# Patient Record
Sex: FEMALE | Race: WHITE | Employment: PART TIME | URBAN - METROPOLITAN AREA
[De-identification: names, ages, dates, MRNs, and addresses within clinical notes are randomized per-mention and may not be internally consistent; named-entity substitution may affect disease eponyms.]

---

## 2017-05-03 ENCOUNTER — OFFICE VISIT (OUTPATIENT)
Dept: FAMILY MEDICINE CLINIC | Age: 47
End: 2017-05-03
Payer: MEDICAID

## 2017-05-03 VITALS
HEIGHT: 65 IN | BODY MASS INDEX: 31.16 KG/M2 | DIASTOLIC BLOOD PRESSURE: 80 MMHG | RESPIRATION RATE: 18 BRPM | WEIGHT: 187 LBS | SYSTOLIC BLOOD PRESSURE: 128 MMHG | HEART RATE: 68 BPM

## 2017-05-03 DIAGNOSIS — I10 ESSENTIAL HYPERTENSION: Primary | ICD-10-CM

## 2017-05-03 DIAGNOSIS — R07.89 LEFT-SIDED CHEST WALL PAIN: ICD-10-CM

## 2017-05-03 DIAGNOSIS — M65.332 TRIGGER FINGER, LEFT MIDDLE FINGER: ICD-10-CM

## 2017-05-03 DIAGNOSIS — Z12.31 SCREENING MAMMOGRAM, ENCOUNTER FOR: ICD-10-CM

## 2017-05-03 PROCEDURE — 99213 OFFICE O/P EST LOW 20 MIN: CPT | Performed by: NURSE PRACTITIONER

## 2017-05-03 RX ORDER — COVID-19 ANTIGEN TEST
220 KIT MISCELLANEOUS 2 TIMES DAILY
Qty: 24 CAPSULE | Refills: 0 | COMMUNITY
Start: 2017-05-03 | End: 2018-01-22 | Stop reason: ALTCHOICE

## 2017-05-03 RX ORDER — HYDROCHLOROTHIAZIDE 25 MG/1
25 TABLET ORAL DAILY
Qty: 30 TABLET | Refills: 5 | Status: SHIPPED | OUTPATIENT
Start: 2017-05-03 | End: 2017-11-22 | Stop reason: SDUPTHER

## 2017-05-03 RX ORDER — COVID-19 ANTIGEN TEST
220 KIT MISCELLANEOUS 2 TIMES DAILY
Qty: 24 CAPSULE | Refills: 0 | COMMUNITY
Start: 2017-05-03 | End: 2017-05-03 | Stop reason: ALTCHOICE

## 2017-05-03 ASSESSMENT — ENCOUNTER SYMPTOMS
COUGH: 0
ABDOMINAL DISTENTION: 0
EYES NEGATIVE: 1
SINUS PRESSURE: 0
SHORTNESS OF BREATH: 0
NAUSEA: 0
RHINORRHEA: 0

## 2017-05-18 ENCOUNTER — HOSPITAL ENCOUNTER (OUTPATIENT)
Age: 47
Discharge: HOME | End: 2017-05-18
Payer: MEDICAID

## 2017-10-24 ENCOUNTER — TELEPHONE (OUTPATIENT)
Dept: FAMILY MEDICINE CLINIC | Age: 47
End: 2017-10-24

## 2017-10-24 DIAGNOSIS — L73.9 FOLLICULITIS: Primary | ICD-10-CM

## 2017-10-24 RX ORDER — CEPHALEXIN 500 MG/1
500 CAPSULE ORAL 3 TIMES DAILY
Qty: 21 CAPSULE | Refills: 0 | Status: SHIPPED | OUTPATIENT
Start: 2017-10-24 | End: 2017-10-31

## 2017-11-22 ENCOUNTER — TELEPHONE (OUTPATIENT)
Dept: FAMILY MEDICINE CLINIC | Age: 47
End: 2017-11-22

## 2017-11-22 DIAGNOSIS — I10 ESSENTIAL HYPERTENSION: ICD-10-CM

## 2017-11-22 RX ORDER — HYDROCHLOROTHIAZIDE 25 MG/1
25 TABLET ORAL DAILY
Qty: 90 TABLET | Refills: 1 | Status: SHIPPED | OUTPATIENT
Start: 2017-11-22 | End: 2018-03-22

## 2017-11-22 NOTE — TELEPHONE ENCOUNTER
Patient asking for a refill on BP medication    Yang Ingrma    Scheduled a Follow up for HTN    Health Maintenance   Topic Date Due    DTaP/Tdap/Td vaccine (1 - Tdap) 05/09/1989    Diabetes screen  05/09/2010    Flu vaccine (1) 09/01/2017    Cervical cancer screen  04/18/2019    Lipid screen  04/28/2021    HIV screen  Addressed             (applicable per patient's age: Cancer Screenings, Depression Screening, Fall Risk Screening, Immunizations)    LDL Cholesterol (mg/dL)   Date Value   04/28/2016 116     AST (U/L)   Date Value   04/28/2016 14     ALT (U/L)   Date Value   04/28/2016 16     BUN (mg/dL)   Date Value   04/28/2016 14      (goal A1C is < 7)   (goal LDL is <100) need 30-50% reduction from baseline     BP Readings from Last 3 Encounters:   05/03/17 128/80   07/25/16 138/84   06/22/16 125/87    (goal /80)      All Future Testing planned in [facility]:  Lab Frequency Next Occurrence   SATNAM CAD SCREENING Once 12/18/2017       Next Visit Date:  No future appointments.          Patient Active Problem List:     Obstructive sleep apnea on CPAP     Essential hypertension     Biliary dyskinesia

## 2018-01-16 ENCOUNTER — APPOINTMENT (OUTPATIENT)
Dept: GENERAL RADIOLOGY | Age: 48
End: 2018-01-16
Payer: COMMERCIAL

## 2018-01-16 ENCOUNTER — HOSPITAL ENCOUNTER (EMERGENCY)
Age: 48
Discharge: HOME | End: 2018-01-16
Attending: FAMILY MEDICINE
Payer: COMMERCIAL

## 2018-01-16 VITALS
HEART RATE: 88 BPM | TEMPERATURE: 98.1 F | SYSTOLIC BLOOD PRESSURE: 154 MMHG | RESPIRATION RATE: 16 BRPM | DIASTOLIC BLOOD PRESSURE: 75 MMHG | OXYGEN SATURATION: 100 %

## 2018-01-16 DIAGNOSIS — S63.502A LEFT WRIST SPRAIN, INITIAL ENCOUNTER: ICD-10-CM
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
## 2018-01-16 DIAGNOSIS — M62.838 TRAPEZIUS MUSCLE SPASM: ICD-10-CM

## 2018-01-16 DIAGNOSIS — S93.492A SPRAIN OF ANTERIOR TALOFIBULAR LIGAMENT OF LEFT ANKLE, INITIAL ENCOUNTER: Primary | ICD-10-CM

## 2018-01-16 PROCEDURE — 6360000002 HC RX W HCPCS: Performed by: FAMILY MEDICINE

## 2018-01-16 PROCEDURE — 73610 X-RAY EXAM OF ANKLE: CPT

## 2018-01-16 PROCEDURE — 73110 X-RAY EXAM OF WRIST: CPT

## 2018-01-16 PROCEDURE — 99284 EMERGENCY DEPT VISIT MOD MDM: CPT

## 2018-01-16 PROCEDURE — 96372 THER/PROPH/DIAG INJ SC/IM: CPT

## 2018-01-16 PROCEDURE — 73030 X-RAY EXAM OF SHOULDER: CPT

## 2018-01-16 RX ORDER — IBUPROFEN 800 MG/1
800 TABLET ORAL EVERY 8 HOURS PRN
Qty: 30 TABLET | Refills: 1 | Status: SHIPPED | OUTPATIENT
Start: 2018-01-16 | End: 2018-03-21 | Stop reason: ALTCHOICE

## 2018-01-16 RX ORDER — CYCLOBENZAPRINE HCL 10 MG
10 TABLET ORAL 3 TIMES DAILY PRN
Qty: 12 TABLET | Refills: 0 | Status: SHIPPED | OUTPATIENT
Start: 2018-01-16 | End: 2018-01-26

## 2018-01-16 RX ORDER — KETOROLAC TROMETHAMINE 30 MG/ML
60 INJECTION, SOLUTION INTRAMUSCULAR; INTRAVENOUS ONCE
Status: COMPLETED | OUTPATIENT
Start: 2018-01-16 | End: 2018-01-16

## 2018-01-16 RX ADMIN — KETOROLAC TROMETHAMINE 60 MG: 30 INJECTION, SOLUTION INTRAMUSCULAR at 21:59

## 2018-01-16 ASSESSMENT — PAIN DESCRIPTION - ORIENTATION: ORIENTATION: LEFT

## 2018-01-16 ASSESSMENT — PAIN SCALES - GENERAL
PAINLEVEL_OUTOF10: 7
PAINLEVEL_OUTOF10: 4

## 2018-01-16 ASSESSMENT — PAIN DESCRIPTION - PAIN TYPE: TYPE: ACUTE PAIN

## 2018-01-17 NOTE — ED PROVIDER NOTES
[facility]  eMERGENCY dEPARTMENT eNCOUnter          Alma Rosa Avilez       Chief Complaint   Patient presents with    Fall     pt. fell at work at approx. 1800. Pt. states she lost her footing on the stairs and fell on her left side. Pt. reports pain in her left trapezius, left hip, left lateral foot and left wrist. Denies hitting her head or LOC. Nurses Notes reviewed and I agree except as noted in the HPI. HISTORY OF PRESENT ILLNESS    Danisha Davis is a 52 y.o. female who presents To emergency room with complaint of accidental fall at work, patient states that she lost her footing on some stairs and fell and/or left side, patient reporting pain in her left hip left lateral foot left wrist, as well as her left shoulder indicating her trapezius muscle group. Patient denies head or head denies loss of consciousness. She rates her wall pain is 7 out of 10. REVIEW OF SYSTEMS     Review of Systems   All other systems reviewed and are negative. PAST MEDICAL HISTORY    has a past medical history of Anxiety; GERD (gastroesophageal reflux disease); Headache(784.0); Hypertension; and Sleep apnea. SURGICAL HISTORY      has a past surgical history that includes Hysterectomy and Cholecystectomy (06-). CURRENT MEDICATIONS       Discharge Medication List as of 1/16/2018 11:03 PM      CONTINUE these medications which have NOT CHANGED    Details   hydrochlorothiazide (HYDRODIURIL) 25 MG tablet Take 1 tablet by mouth daily, Disp-90 tablet, R-1Normal      Naproxen Sodium (ALEVE) 220 MG CAPS Take 220 mg by mouth 2 times daily, Disp-24 capsule, [id number] exp 2/19Sample      !! ibuprofen (ADVIL;MOTRIN) 200 MG tablet Take 400 mg by mouth every 6 hours as needed for Pain      omeprazole (PRILOSEC) 40 MG capsule Take 1 capsule by mouth daily, Disp-30 capsule, R-5       !! - Potential duplicate medications found. Please discuss with provider.           ALLERGIES     is allergic to codeine and morphine. FAMILY HISTORY     indicated that her mother is . She indicated that her father is . She indicated that all of her five sisters are alive. She indicated that her brother is alive. She indicated that her maternal grandmother is . She indicated that her maternal grandfather is . She indicated that her paternal grandmother is . She indicated that her paternal grandfather is . She indicated that her daughter is alive. She indicated that her son is alive. family history includes Cancer in her father and paternal grandmother. SOCIAL HISTORY      reports that she has never smoked. She does not have any smokeless tobacco history on file. She reports that she does not drink alcohol or use drugs. PHYSICAL EXAM     INITIAL VITALS:  oral temperature is 98.1 °F (36.7 °C). Her blood pressure is 154/75 (abnormal) and her pulse is 88. Her respiration is 16 and oxygen saturation is 100%. Physical Exam   Constitutional: Patient is oriented to person, place, and time. Patient appears well-developed and well-nourished. Patient is active and cooperative. HENT:   Head: Normocephalic and atraumatic. Head is without contusion. Right Ear: Hearing and external ear normal. No drainage. Left Ear: Hearing and external ear normal. No drainage. Nose: Nose normal. No nasal deformity. No epistaxis. Mouth/Throat: Mucous membranes are not dry. Eyes: EOMI. Conjunctivae, sclera, and lids are normal. Right eye exhibits no discharge. Left eye exhibits no discharge. Neck: Full passive range of motion without pain and phonation normal.  Left superior trapezius muscle with spasm as compared to contralateral without overlying integument aberration  Cardiovascular:  Normal rate, regular rhythm and intact distal pulses. Pulses: Left radial pulse  2+   Pulmonary/Chest: Effort normal. No tachypnea and no bradypnea.  No wheezes, rhonchi, or

## 2018-01-22 ENCOUNTER — OFFICE VISIT (OUTPATIENT)
Dept: FAMILY MEDICINE CLINIC | Age: 48
End: 2018-01-22
Payer: MEDICAID

## 2018-01-22 VITALS
DIASTOLIC BLOOD PRESSURE: 78 MMHG | RESPIRATION RATE: 18 BRPM | HEART RATE: 96 BPM | WEIGHT: 221 LBS | OXYGEN SATURATION: 97 % | BODY MASS INDEX: 36.82 KG/M2 | SYSTOLIC BLOOD PRESSURE: 128 MMHG | HEIGHT: 65 IN

## 2018-01-22 DIAGNOSIS — Z13.220 SCREENING CHOLESTEROL LEVEL: ICD-10-CM

## 2018-01-22 DIAGNOSIS — Z79.899 ENCOUNTER FOR MONITORING DIURETIC THERAPY: ICD-10-CM

## 2018-01-22 DIAGNOSIS — Z51.81 ENCOUNTER FOR MONITORING DIURETIC THERAPY: ICD-10-CM

## 2018-01-22 DIAGNOSIS — G47.30 SLEEP APNEA, UNSPECIFIED TYPE: ICD-10-CM

## 2018-01-22 DIAGNOSIS — Z13.0 SCREENING, ANEMIA, DEFICIENCY, IRON: ICD-10-CM

## 2018-01-22 DIAGNOSIS — E55.9 VITAMIN D DEFICIENCY: ICD-10-CM

## 2018-01-22 DIAGNOSIS — I10 ESSENTIAL HYPERTENSION: Primary | ICD-10-CM

## 2018-01-22 DIAGNOSIS — E66.09 CLASS 2 OBESITY DUE TO EXCESS CALORIES WITHOUT SERIOUS COMORBIDITY WITH BODY MASS INDEX (BMI) OF 36.0 TO 36.9 IN ADULT: ICD-10-CM

## 2018-01-22 DIAGNOSIS — K21.9 GASTROESOPHAGEAL REFLUX DISEASE WITHOUT ESOPHAGITIS: ICD-10-CM

## 2018-01-22 DIAGNOSIS — Z13.29 THYROID DISORDER SCREENING: ICD-10-CM

## 2018-01-22 DIAGNOSIS — I87.2 VENOUS STASIS DERMATITIS OF RIGHT LOWER EXTREMITY: ICD-10-CM

## 2018-01-22 DIAGNOSIS — K14.4 SMOOTH TONGUE: ICD-10-CM

## 2018-01-22 DIAGNOSIS — Z83.3 FAMILY HISTORY OF DIABETES MELLITUS: ICD-10-CM

## 2018-01-22 PROCEDURE — 99214 OFFICE O/P EST MOD 30 MIN: CPT | Performed by: NURSE PRACTITIONER

## 2018-01-22 PROCEDURE — G8417 CALC BMI ABV UP PARAM F/U: HCPCS | Performed by: NURSE PRACTITIONER

## 2018-01-22 PROCEDURE — G8484 FLU IMMUNIZE NO ADMIN: HCPCS | Performed by: NURSE PRACTITIONER

## 2018-01-22 PROCEDURE — 1036F TOBACCO NON-USER: CPT | Performed by: NURSE PRACTITIONER

## 2018-01-22 PROCEDURE — G8427 DOCREV CUR MEDS BY ELIG CLIN: HCPCS | Performed by: NURSE PRACTITIONER

## 2018-01-22 RX ORDER — SUCRALFATE 1 G/1
1 TABLET ORAL DAILY
Qty: 30 TABLET | Refills: 1 | Status: SHIPPED | OUTPATIENT
Start: 2018-01-22 | End: 2018-03-22

## 2018-01-22 RX ORDER — OMEPRAZOLE 20 MG/1
20 CAPSULE, DELAYED RELEASE ORAL DAILY
Qty: 90 CAPSULE | Refills: 0 | Status: SHIPPED | OUTPATIENT
Start: 2018-01-22 | End: 2018-03-22

## 2018-01-22 ASSESSMENT — PATIENT HEALTH QUESTIONNAIRE - PHQ9
SUM OF ALL RESPONSES TO PHQ QUESTIONS 1-9: 0
1. LITTLE INTEREST OR PLEASURE IN DOING THINGS: 0
SUM OF ALL RESPONSES TO PHQ9 QUESTIONS 1 & 2: 0
2. FEELING DOWN, DEPRESSED OR HOPELESS: 0

## 2018-01-22 NOTE — PROGRESS NOTES
[facility] [address]  Dept: [phone]  Dept Fax: [phone]    Last encounter Visit date not found  Visit Information    Have you changed or started any medications since your last visit including any over-the-counter medicines, vitamins, or herbal medicines? no   Are you having any side effects from any of your medications? -  no  Have you stopped taking any of your medications? Is so, why? -  no    Have you seen any other physician or provider since your last visit? Yes - Records Obtained  Have you had any other diagnostic tests since your last visit? Yes - Records Obtained  Have you been seen in the emergency room and/or had an admission to a hospital since we last saw you? Yes - Records Obtained  Have you had your routine dental cleaning in the past 6 months? no    Have you activated your [employer] account? If not, what are your barriers? Yes     Patient Care Team:  Alyssa Brandon NP as PCP - General (Certified Nurse Practitioner)    Medical History Review  Past Medical, Family, and Social History reviewed and does contribute to the patient presenting condition    Health Maintenance   Topic Date Due    DTaP/Tdap/Td vaccine (1 - Tdap) 05/09/1989    Diabetes screen  05/09/2010    Potassium monitoring  04/28/2017    Creatinine monitoring  04/28/2017    Flu vaccine (1) 09/01/2017    Cervical cancer screen  04/18/2019    Lipid screen  04/28/2021    HIV screen  Addressed       HPI:   Murphy Espinosa is a 52 y.o. female who presents today for her medical conditions/complaints as noted below. Murphy Espinosa is c/o of Check-Up (HTN /Knee pain, SOB, caught self dozing off, Headaches within the last month )      HPI    Hypertension- no chest pain, some headache recently with occipital protuberance. Sleep apnea with waking gasping, low energy during the day, sleepiness during the day.  Hx sleep apnea 77% on study did not tolerate full mask no longer has cpap machine aware need for retitration and order supplies/machine due to risk sudden cardiac death. C/o overall fatigue   Daytime somnolesce. Labs and screening test ordered with recent weight over 6 months. Hot intolerance, increase thirst. No increase hunger or urination. Swelling in hands and feet and legs tight. On ibuprofen and flexeril for fall with leg and shoulder injury. -work related    GERD restart omeprazole and carafate. C/o recurrence of GERD will restart medication. Hypertension on HCTZ with good bp control but does feel swollen. Stasis dermatitis on RLE with pruritic sensation and itched areas from patient. Will add bactroban to loer legs.          Past Medical History:   Diagnosis Date    Anxiety     GERD (gastroesophageal reflux disease)     Headache(784.0)     Hypertension     Sleep apnea       Past Surgical History:   Procedure Laterality Date    CHOLECYSTECTOMY  06-    laproscopic    HYSTERECTOMY         Family History   Problem Relation Age of Onset    Cancer Father      Lung Cancer    Cancer Paternal Grandmother      Ovarian Cancer       Social History   Substance Use Topics    Smoking status: Never Smoker    Smokeless tobacco: Never Used    Alcohol use No      Current Outpatient Prescriptions   Medication Sig Dispense Refill    omeprazole (PRILOSEC) 20 MG delayed release capsule Take 1 capsule by mouth daily 90 capsule 0    sucralfate (CARAFATE) 1 GM tablet Take 1 tablet by mouth daily 30 min before meal 30 tablet 1    mupirocin (BACTROBAN) 2 % ointment Apply thin coat topically to right lower leg rash/venous stasis changes 30 g 0    ibuprofen (ADVIL;MOTRIN) 800 MG tablet Take 1 tablet by mouth every 8 hours as needed for Pain 30 tablet 1    hydrochlorothiazide (HYDRODIURIL) 25 MG tablet Take 1 tablet by mouth daily 90 tablet 1    cyclobenzaprine (FLEXERIL) 10 MG tablet Take 1 tablet by mouth 3 times daily as needed for Muscle spasms 12 tablet 0     No current facility-administered medications for this visit. Allergies   Allergen Reactions    Codeine Nausea And Vomiting    Morphine      Vomiting and nausea        Health Maintenance   Topic Date Due    DTaP/Tdap/Td vaccine (1 - Tdap) 05/09/1989    Diabetes screen  05/09/2010    Potassium monitoring  04/28/2017    Creatinine monitoring  04/28/2017    Flu vaccine (1) 09/01/2017    Cervical cancer screen  04/18/2019    Lipid screen  04/28/2021    HIV screen  Addressed       Subjective:      Review of Systems   Constitutional: Positive for fatigue. Negative for activity change, appetite change and chills. HENT: Negative for congestion, rhinorrhea and sore throat. Eyes: Negative. Respiratory: Negative for cough. Cardiovascular: Positive for leg swelling (generalized). Gastrointestinal: Negative for abdominal distention and diarrhea. Genitourinary: Negative for difficulty urinating. Musculoskeletal: Negative for arthralgias. Skin: Positive for rash (RLL). Neurological: Negative for dizziness. Psychiatric/Behavioral: Positive for sleep disturbance. The patient is not nervous/anxious. Objective:     Physical Exam   Constitutional: She is oriented to person, place, and time. She appears well-developed and well-nourished. No distress. HENT:   Head: Normocephalic and atraumatic. Right Ear: External ear normal.   Left Ear: External ear normal.   Mouth/Throat: Oropharynx is clear and moist.   Eyes: EOM are normal. Pupils are equal, round, and reactive to light. No scleral icterus. Neck: Normal range of motion. Neck supple. No thyromegaly present. Cardiovascular: Normal rate, regular rhythm, normal heart sounds and intact distal pulses. No murmur heard. Pulmonary/Chest: Effort normal and breath sounds normal. No respiratory distress. She has no wheezes. Abdominal: Soft. Bowel sounds are normal. She exhibits no mass. There is no tenderness. Musculoskeletal: Normal range of motion. She exhibits edema. She exhibits no tenderness. Lymphadenopathy:     She has no cervical adenopathy. Neurological: She is alert and oriented to person, place, and time. Skin: Skin is warm and dry. Rash (RLL with stasis dermatitis changes, darkened skin scratches with scabs present. no open areas. pulses intact generalized non pitting edema) noted. Psychiatric: She has a normal mood and affect. Her behavior is normal. Judgment and thought content normal.   Nursing note and vitals reviewed. /78 (Site: Left Arm, Position: Sitting, Cuff Size: Medium Adult)   Pulse 96   Resp 18   Ht 5' 5\" (1.651 m)   Wt 221 lb (100.2 kg)   SpO2 97%   BMI 36.78 kg/m²     Data:     Lab Results   Component Value Date     04/28/2016    K 4.1 04/28/2016     04/28/2016    CO2 22 04/28/2016    BUN 14 04/28/2016    CREATININE 0.66 04/28/2016    GLUCOSE 104 04/28/2016    PROT 7.2 04/28/2016    LABALBU 4.3 04/28/2016    BILITOT 0.42 04/28/2016    ALKPHOS 49 04/28/2016    AST 14 04/28/2016    ALT 16 04/28/2016     Lab Results   Component Value Date    WBC 7.0 06/09/2016    RBC 4.53 06/09/2016    HGB 13.3 06/09/2016    HCT 39.0 06/09/2016    MCV 86.0 06/09/2016    MCH 29.3 06/09/2016    MCHC 34.0 06/09/2016    RDW 13.4 06/09/2016     06/09/2016    MPV NOT REPORTED 06/09/2016     No results found for: TSH  Lab Results   Component Value Date    CHOL 181 04/28/2016    HDL 48 04/28/2016          Assessment & Plan       1. Essential hypertension  Stable and controlled  Will check labs overdue.   - Comprehensive Metabolic Panel; Future  - CBC Auto Differential; Future    2. Gastroesophageal reflux disease without esophagitis  Restart PPI and carafate.   - omeprazole (PRILOSEC) 20 MG delayed release capsule; Take 1 capsule by mouth daily  Dispense: 90 capsule; Refill: 0  - Comprehensive Metabolic Panel; Future    3.  Family history of diabetes mellitus  Due to rapid wt gain Dispense:  30 tablet     Refill:  1    mupirocin (BACTROBAN) 2 % ointment     Sig: Apply thin coat topically to right lower leg rash/venous stasis changes     Dispense:  30 g     Refill:  0     Orders Placed This Encounter   Procedures    Comprehensive Metabolic Panel     Standing Status:   Future     Standing Expiration Date:   1/22/2019    Lipid Panel     Standing Status:   Future     Standing Expiration Date:   1/22/2019     Order Specific Question:   Is Patient Fasting?/# of Hours     Answer:   yes    Vitamin D 25 Hydroxy     Standing Status:   Future     Standing Expiration Date:   1/22/2019    Vitamin B12 & Folate     Standing Status:   Future     Standing Expiration Date:   1/22/2019    TSH with Reflex     Standing Status:   Future     Standing Expiration Date:   1/22/2019    CBC Auto Differential     Standing Status:   Future     Standing Expiration Date:   1/22/2019    Sleep Study with PAP Titration     Standing Status:   Future     Standing Expiration Date:   1/22/2019     Order Specific Question:   Sleep Study Titration Type     Answer:   CPAP     Order Specific Question:   Location For Sleep Study     Answer:   [facility]     Order Specific Question:   Select a sleep lab location     Answer:   [facility] received counseling on the following healthy behaviors: nutrition, exercise and medication adherence  Reviewed prior labs and health maintenance. Continue current medications, diet and exercise. Discussed use, benefit, and side effects of prescribed medications. Barriers to medication compliance addressed. Patient given educational materials - see patient instructions. All patient questions answered. Patient voiced understanding.           Electronically signed by Joe Veloz NP on 1/23/2018 at 10:23 PM

## 2018-01-23 ASSESSMENT — ENCOUNTER SYMPTOMS
RHINORRHEA: 0
EYES NEGATIVE: 1
DIARRHEA: 0
SORE THROAT: 0
COUGH: 0
ABDOMINAL DISTENTION: 0

## 2018-01-25 ENCOUNTER — HOSPITAL ENCOUNTER (OUTPATIENT)
Age: 48
Discharge: HOME | End: 2018-01-25
Payer: MEDICAID

## 2018-01-25 DIAGNOSIS — Z12.31 SCREENING MAMMOGRAM, ENCOUNTER FOR: ICD-10-CM

## 2018-01-25 DIAGNOSIS — K14.4 SMOOTH TONGUE: ICD-10-CM

## 2018-01-25 DIAGNOSIS — E55.9 VITAMIN D DEFICIENCY: ICD-10-CM

## 2018-01-25 DIAGNOSIS — Z13.220 SCREENING CHOLESTEROL LEVEL: ICD-10-CM

## 2018-01-25 DIAGNOSIS — E66.09 CLASS 2 OBESITY DUE TO EXCESS CALORIES WITHOUT SERIOUS COMORBIDITY WITH BODY MASS INDEX (BMI) OF 36.0 TO 36.9 IN ADULT: ICD-10-CM

## 2018-01-25 DIAGNOSIS — Z13.0 SCREENING, ANEMIA, DEFICIENCY, IRON: ICD-10-CM

## 2018-01-25 DIAGNOSIS — G47.30 SLEEP APNEA, UNSPECIFIED TYPE: ICD-10-CM

## 2018-01-25 DIAGNOSIS — K21.9 GASTROESOPHAGEAL REFLUX DISEASE WITHOUT ESOPHAGITIS: ICD-10-CM

## 2018-01-25 DIAGNOSIS — Z13.29 THYROID DISORDER SCREENING: ICD-10-CM

## 2018-01-25 DIAGNOSIS — I10 ESSENTIAL HYPERTENSION: ICD-10-CM

## 2018-01-25 LAB
ABSOLUTE EOS #: 0.2 K/UL (ref 0–0.4)
ABSOLUTE IMMATURE GRANULOCYTE: NORMAL K/UL (ref 0–0.3)
ABSOLUTE LYMPH #: 3.4 K/UL (ref 1–4.8)
ABSOLUTE MONO #: 0.5 K/UL (ref 0–1)
ALBUMIN SERPL-MCNC: 4.4 G/DL (ref 3.5–5.2)
ALBUMIN/GLOBULIN RATIO: ABNORMAL (ref 1–2.5)
ALP BLD-CCNC: 53 U/L (ref 35–104)
ALT SERPL-CCNC: 30 U/L (ref 5–33)
ANION GAP SERPL CALCULATED.3IONS-SCNC: 13 MMOL/L (ref 9–17)
AST SERPL-CCNC: 21 U/L
BASOPHILS # BLD: 1 % (ref 0–2)
BASOPHILS ABSOLUTE: 0.1 K/UL (ref 0–0.2)
BILIRUB SERPL-MCNC: 0.52 MG/DL (ref 0.3–1.2)
BUN BLDV-MCNC: 18 MG/DL (ref 6–20)
BUN/CREAT BLD: 27 (ref 9–20)
CALCIUM SERPL-MCNC: 9.5 MG/DL (ref 8.6–10.4)
CHLORIDE BLD-SCNC: 104 MMOL/L (ref 98–107)
CHOLESTEROL/HDL RATIO: 4.3
CHOLESTEROL: 208 MG/DL
CO2: 27 MMOL/L (ref 20–31)
CREAT SERPL-MCNC: 0.67 MG/DL (ref 0.5–0.9)
DIFFERENTIAL TYPE: YES
EOSINOPHILS RELATIVE PERCENT: 3 % (ref 0–5)
FOLATE: >20 NG/ML
GFR AFRICAN AMERICAN: >60 ML/MIN
GFR NON-AFRICAN AMERICAN: >60 ML/MIN
GFR SERPL CREATININE-BSD FRML MDRD: ABNORMAL ML/MIN/{1.73_M2}
GFR SERPL CREATININE-BSD FRML MDRD: ABNORMAL ML/MIN/{1.73_M2}
GLUCOSE BLD-MCNC: 117 MG/DL (ref 70–99)
HCT VFR BLD CALC: 39.4 % (ref 36–46)
HDLC SERPL-MCNC: 48 MG/DL
HEMOGLOBIN: 13.4 G/DL (ref 12–16)
IMMATURE GRANULOCYTES: NORMAL %
LDL CHOLESTEROL: 135 MG/DL (ref 0–130)
LYMPHOCYTES # BLD: 39 % (ref 15–40)
MCH RBC QN AUTO: 29.4 PG (ref 26–34)
MCHC RBC AUTO-ENTMCNC: 33.9 G/DL (ref 31–37)
MCV RBC AUTO: 86.6 FL (ref 80–100)
MONOCYTES # BLD: 6 % (ref 4–8)
NRBC AUTOMATED: NORMAL PER 100 WBC
PATIENT FASTING?: YES
PDW BLD-RTO: 13.3 % (ref 12.1–15.2)
PLATELET # BLD: 345 K/UL (ref 140–450)
PLATELET ESTIMATE: NORMAL
PMV BLD AUTO: NORMAL FL (ref 6–12)
POTASSIUM SERPL-SCNC: 4.1 MMOL/L (ref 3.7–5.3)
RBC # BLD: 4.55 M/UL (ref 4–5.2)
RBC # BLD: NORMAL 10*6/UL
SEG NEUTROPHILS: 51 % (ref 47–75)
SEGMENTED NEUTROPHILS ABSOLUTE COUNT: 4.4 K/UL (ref 2.5–7)
SODIUM BLD-SCNC: 144 MMOL/L (ref 135–144)
TOTAL PROTEIN: 7.7 G/DL (ref 6.4–8.3)
TRIGL SERPL-MCNC: 127 MG/DL
TSH SERPL DL<=0.05 MIU/L-ACNC: 4.31 MIU/L (ref 0.3–5)
VITAMIN B-12: 341 PG/ML (ref 211–946)
VITAMIN D 25-HYDROXY: 27.9 NG/ML (ref 30–100)
VLDLC SERPL CALC-MCNC: ABNORMAL MG/DL (ref 1–30)
WBC # BLD: 8.6 K/UL (ref 3.5–11)
WBC # BLD: NORMAL 10*3/UL

## 2018-01-25 PROCEDURE — 83036 HEMOGLOBIN GLYCOSYLATED A1C: CPT

## 2018-01-25 PROCEDURE — 80053 COMPREHEN METABOLIC PANEL: CPT

## 2018-01-25 PROCEDURE — 82306 VITAMIN D 25 HYDROXY: CPT

## 2018-01-25 PROCEDURE — 80061 LIPID PANEL: CPT

## 2018-01-25 PROCEDURE — 84443 ASSAY THYROID STIM HORMONE: CPT

## 2018-01-25 PROCEDURE — 85025 COMPLETE CBC W/AUTO DIFF WBC: CPT

## 2018-01-25 PROCEDURE — 82746 ASSAY OF FOLIC ACID SERUM: CPT

## 2018-01-25 PROCEDURE — 82607 VITAMIN B-12: CPT

## 2018-01-25 PROCEDURE — 36415 COLL VENOUS BLD VENIPUNCTURE: CPT

## 2018-01-26 DIAGNOSIS — R73.01 IFG (IMPAIRED FASTING GLUCOSE): Primary | ICD-10-CM

## 2018-01-26 LAB
ESTIMATED AVERAGE GLUCOSE: 105 MG/DL
HBA1C MFR BLD: 5.3 % (ref 4.8–5.9)

## 2018-02-26 ENCOUNTER — HOSPITAL ENCOUNTER (OUTPATIENT)
Dept: SLEEP CENTER | Age: 48
Discharge: HOME | End: 2018-02-26
Payer: MEDICAID

## 2018-02-26 PROCEDURE — 95811 POLYSOM 6/>YRS CPAP 4/> PARM: CPT

## 2018-02-26 ASSESSMENT — SLEEP AND FATIGUE QUESTIONNAIRES: DO YOU SNORE: YES

## 2018-02-27 VITALS — HEART RATE: 78 BPM | OXYGEN SATURATION: 95 %

## 2018-02-27 ASSESSMENT — SLEEP AND FATIGUE QUESTIONNAIRES
HOW LIKELY ARE YOU TO NOD OFF OR FALL ASLEEP WHILE WATCHING TV: 3
HOW LIKELY ARE YOU TO NOD OFF OR FALL ASLEEP IN A CAR, WHILE STOPPED FOR A FEW MINUTES IN TRAFFIC: 0
HOW LIKELY ARE YOU TO NOD OFF OR FALL ASLEEP WHILE SITTING INACTIVE IN A PUBLIC PLACE: 0
HOW LIKELY ARE YOU TO NOD OFF OR FALL ASLEEP WHILE SITTING AND TALKING TO SOMEONE: 0
HOW LIKELY ARE YOU TO NOD OFF OR FALL ASLEEP WHEN YOU ARE A PASSENGER IN A CAR FOR AN HOUR WITHOUT A BREAK: 2
HOW LIKELY ARE YOU TO NOD OFF OR FALL ASLEEP WHILE LYING DOWN TO REST IN THE AFTERNOON WHEN CIRCUMSTANCES PERMIT: 3
HOW LIKELY ARE YOU TO NOD OFF OR FALL ASLEEP WHILE SITTING AND READING: 2
ESS TOTAL SCORE: 12
NECK CIRCUMFERENCE (INCHES): 15
HOW LIKELY ARE YOU TO NOD OFF OR FALL ASLEEP WHILE SITTING QUIETLY AFTER LUNCH WITHOUT ALCOHOL: 2

## 2018-03-21 ENCOUNTER — HOSPITAL ENCOUNTER (EMERGENCY)
Age: 48
Discharge: HOME | End: 2018-03-22
Attending: FAMILY MEDICINE
Payer: MEDICAID

## 2018-03-21 DIAGNOSIS — J01.00 ACUTE MAXILLARY SINUSITIS, RECURRENCE NOT SPECIFIED: ICD-10-CM

## 2018-03-21 DIAGNOSIS — J40 BRONCHITIS: Primary | ICD-10-CM

## 2018-03-21 DIAGNOSIS — H66.90 ACUTE OTITIS MEDIA, UNSPECIFIED OTITIS MEDIA TYPE: ICD-10-CM

## 2018-03-21 PROCEDURE — 99283 EMERGENCY DEPT VISIT LOW MDM: CPT

## 2018-03-21 ASSESSMENT — PAIN SCALES - GENERAL: PAINLEVEL_OUTOF10: 8

## 2018-03-22 VITALS
TEMPERATURE: 98 F | HEART RATE: 106 BPM | RESPIRATION RATE: 20 BRPM | OXYGEN SATURATION: 97 % | SYSTOLIC BLOOD PRESSURE: 150 MMHG | DIASTOLIC BLOOD PRESSURE: 91 MMHG

## 2018-03-22 LAB
DIRECT EXAM: NORMAL
Lab: NORMAL
SPECIMEN DESCRIPTION: NORMAL
STATUS: NORMAL

## 2018-03-22 PROCEDURE — 87651 STREP A DNA AMP PROBE: CPT

## 2018-03-22 PROCEDURE — 87804 INFLUENZA ASSAY W/OPTIC: CPT

## 2018-03-22 PROCEDURE — 6370000000 HC RX 637 (ALT 250 FOR IP): Performed by: FAMILY MEDICINE

## 2018-03-22 RX ORDER — SUCRALFATE 1 G/1
1 TABLET ORAL DAILY
COMMUNITY
End: 2020-04-28

## 2018-03-22 RX ORDER — PREDNISONE 20 MG/1
20 TABLET ORAL ONCE
Status: COMPLETED | OUTPATIENT
Start: 2018-03-22 | End: 2018-03-22

## 2018-03-22 RX ORDER — OMEPRAZOLE 20 MG/1
20 CAPSULE, DELAYED RELEASE ORAL DAILY
COMMUNITY
End: 2020-04-28

## 2018-03-22 RX ORDER — AMOXICILLIN 500 MG/1
500 CAPSULE ORAL ONCE
Status: COMPLETED | OUTPATIENT
Start: 2018-03-22 | End: 2018-03-22

## 2018-03-22 RX ORDER — HYDROCHLOROTHIAZIDE 25 MG/1
25 TABLET ORAL DAILY
COMMUNITY
End: 2020-04-24 | Stop reason: ALTCHOICE

## 2018-03-22 RX ORDER — BENZONATATE 100 MG/1
200 CAPSULE ORAL ONCE
Status: COMPLETED | OUTPATIENT
Start: 2018-03-22 | End: 2018-03-22

## 2018-03-22 RX ORDER — FLUTICASONE PROPIONATE 50 MCG
1 SPRAY, SUSPENSION (ML) NASAL DAILY
Qty: 1 BOTTLE | Refills: 0 | Status: SHIPPED | OUTPATIENT
Start: 2018-03-22 | End: 2020-04-28

## 2018-03-22 RX ORDER — CETIRIZINE HYDROCHLORIDE 10 MG/1
10 TABLET ORAL ONCE
Status: COMPLETED | OUTPATIENT
Start: 2018-03-22 | End: 2018-03-22

## 2018-03-22 RX ORDER — AMOXICILLIN 500 MG/1
500 CAPSULE ORAL 3 TIMES DAILY
Qty: 30 CAPSULE | Refills: 0 | Status: SHIPPED | OUTPATIENT
Start: 2018-03-22 | End: 2018-04-01

## 2018-03-22 RX ORDER — ACETAMINOPHEN 500 MG
1000 TABLET ORAL ONCE
Status: COMPLETED | OUTPATIENT
Start: 2018-03-22 | End: 2018-03-22

## 2018-03-22 RX ORDER — CETIRIZINE HYDROCHLORIDE 10 MG/1
10 TABLET ORAL DAILY
Status: DISCONTINUED | OUTPATIENT
Start: 2018-03-22 | End: 2018-03-22

## 2018-03-22 RX ADMIN — PREDNISONE 20 MG: 20 TABLET ORAL at 01:01

## 2018-03-22 RX ADMIN — CETIRIZINE HYDROCHLORIDE 10 MG: 10 TABLET, FILM COATED ORAL at 01:01

## 2018-03-22 RX ADMIN — ACETAMINOPHEN 1000 MG: 500 TABLET ORAL at 01:00

## 2018-03-22 RX ADMIN — BENZONATATE 200 MG: 100 CAPSULE ORAL at 01:01

## 2018-03-22 RX ADMIN — AMOXICILLIN 500 MG: 500 CAPSULE ORAL at 01:01

## 2018-03-22 ASSESSMENT — PAIN SCALES - GENERAL: PAINLEVEL_OUTOF10: 8

## 2018-03-22 NOTE — ED PROVIDER NOTES
eMERGENCY dEPARTMENT eNCOUnter        [facility]    Chief Complaint   Patient presents with    Cough    Facial Pain     left side of face pain and sinus pressure    Otalgia     left ear       HPI    Felisha Linn is a 52 y.o. female who presents to ED from Home by private vehicle with boyfriend. He is in another room also sick. She is having a cough sometimes productive of yellow sputum. She also cannot hear out of her left ear and it hurts. The left side of her face is tender and hurts. No fever or chills, no vomiting or diarrhea.       REVIEW OF SYSTEMS    All systems reviewed and positives are in the HPI      PAST MEDICAL HISTORY    Past Medical History:   Diagnosis Date    Anxiety     GERD (gastroesophageal reflux disease)     Headache(784.0)     Hypertension     Sleep apnea        SURGICAL HISTORY    Past Surgical History:   Procedure Laterality Date    CHOLECYSTECTOMY  06-    laproscopic    HYSTERECTOMY         CURRENT MEDICATIONS    Current Outpatient Rx   Medication Sig Dispense Refill    hydrochlorothiazide (HYDRODIURIL) 25 MG tablet Take 25 mg by mouth daily      omeprazole (PRILOSEC) 20 MG delayed release capsule Take 20 mg by mouth daily      sucralfate (CARAFATE) 1 GM tablet Take 1 g by mouth daily      amoxicillin (AMOXIL) 500 MG capsule Take 1 capsule by mouth 3 times daily for 10 days 30 capsule 0    fluticasone (FLONASE) 50 MCG/ACT nasal spray 1 spray by Nasal route daily 1 Bottle 0    loratadine-pseudoephedrine (CLARITIN-D 12 HOUR) 5-120 MG per extended release tablet Take 1 tablet by mouth 2 times daily 60 tablet 0       ALLERGIES    Allergies   Allergen Reactions    Codeine Nausea And Vomiting    Morphine      Vomiting and nausea        FAMILY HISTORY    Family History   Problem Relation Age of Onset    Cancer Father      Lung Cancer    Cancer Paternal Grandmother      Ovarian Cancer       SOCIAL HISTORY    Social History     Social History    Marital status:      Spouse name: N/A    Number of children: N/A    Years of education: N/A     Social History Main Topics    Smoking status: Never Smoker    Smokeless tobacco: Never Used    Alcohol use No    Drug use: No    Sexual activity: Yes     Partners: Male     Other Topics Concern    None     Social History Narrative    None       PHYSICAL EXAM    VITAL SIGNS: There were no vitals taken for this visit. Constitutional:  Well developed, well nourished, no acute distress, non-toxic appearance   Eyes:  Pupils equally round and reactive to light extraocular motions normal.  HENT: Normocephalic and atraumatic. External ears normal.  The right ear is normal TM. The left ear TM is slightly red and is severely retracted. The left maxillary and frontal sinuses are tender to palpation. The posterior pharynx is normal.  No lymphadenopathy is noted. Respiratory:  No respiratory distress, normal breath sounds   Cardiovascular:  Normal rate, normal rhythm, no murmurs, no gallops, no rubs   Musculoskeletal:  No edema, no tenderness, no deformities. Back- no tenderness   Integument:  Well hydrated, no rash   Neurologic:  Cranial nerves II through XII intact. Motor and sensory function normal.  EKG        RADIOLOGY  No orders to display       Labs  Labs Reviewed   STREP SCREEN GROUP A THROAT   RAPID INFLUENZA A/B ANTIGENS   STREP A DNA PROBE, AMPLIFICATION       PROCEDURES          Summation      Patient Course: 80-year-old female with cough left ear pain and lack of hearing tender left face. She is diagnosed with left otitis media and severe eustachian tube dysfunction. He also has a maxillary and possibly frontal sinusitis. She is treated with one dose of prednisone 20 mg here and is given Claritin-D, amoxicillin, Flonase at home. Follow up primary care.     ED Medications administered this visit:    Medications   amoxicillin (AMOXIL) capsule 500 mg (not administered)   predniSONE (DELTASONE) tablet 20 mg (not administered)   benzonatate (TESSALON) capsule 200 mg (not administered)   acetaminophen (TYLENOL) tablet 1,000 mg (not administered)   cetirizine (ZYRTEC) tablet 10 mg (not administered)       New Prescriptions from this visit:    New Prescriptions    AMOXICILLIN (AMOXIL) 500 MG CAPSULE    Take 1 capsule by mouth 3 times daily for 10 days    FLUTICASONE (FLONASE) 50 MCG/ACT NASAL SPRAY    1 spray by Nasal route daily    LORATADINE-PSEUDOEPHEDRINE (CLARITIN-D 12 HOUR) 5-120 MG PER EXTENDED RELEASE TABLET    Take 1 tablet by mouth 2 times daily       Follow-up:  Jarrell Talamantes NP  [address] [facility] [address]  [phone]    In 3 days  If symptoms worsen        Final Impression:   1. Bronchitis    2. Acute maxillary sinusitis, recurrence not specified    3.  Acute otitis media, unspecified otitis media type               (Please note that portions of this note were completed with a voice recognition program.  Efforts were made to edit the dictations but occasionally words are mis-transcribed.)        Luz Maria Armstrong MD  03/22/18 0104

## 2020-04-24 RX ORDER — LISINOPRIL 5 MG/1
5 TABLET ORAL DAILY
Qty: 30 TABLET | Refills: 1 | Status: SHIPPED | OUTPATIENT
Start: 2020-04-24 | End: 2020-04-28 | Stop reason: SINTOL

## 2020-04-28 ENCOUNTER — OFFICE VISIT (OUTPATIENT)
Dept: PRIMARY CARE CLINIC | Age: 50
End: 2020-04-28
Payer: COMMERCIAL

## 2020-04-28 VITALS
HEIGHT: 67 IN | BODY MASS INDEX: 37.51 KG/M2 | OXYGEN SATURATION: 97 % | DIASTOLIC BLOOD PRESSURE: 110 MMHG | HEART RATE: 93 BPM | SYSTOLIC BLOOD PRESSURE: 160 MMHG | WEIGHT: 239 LBS | TEMPERATURE: 98.1 F

## 2020-04-28 PROCEDURE — 1036F TOBACCO NON-USER: CPT | Performed by: NURSE PRACTITIONER

## 2020-04-28 PROCEDURE — G8427 DOCREV CUR MEDS BY ELIG CLIN: HCPCS | Performed by: NURSE PRACTITIONER

## 2020-04-28 PROCEDURE — 99214 OFFICE O/P EST MOD 30 MIN: CPT | Performed by: NURSE PRACTITIONER

## 2020-04-28 PROCEDURE — G8417 CALC BMI ABV UP PARAM F/U: HCPCS | Performed by: NURSE PRACTITIONER

## 2020-04-28 ASSESSMENT — ENCOUNTER SYMPTOMS
WHEEZING: 0
SORE THROAT: 0
EYES NEGATIVE: 1
CHEST TIGHTNESS: 0
GASTROINTESTINAL NEGATIVE: 1
COUGH: 0
SHORTNESS OF BREATH: 0
ALLERGIC/IMMUNOLOGIC NEGATIVE: 1

## 2020-04-28 ASSESSMENT — PATIENT HEALTH QUESTIONNAIRE - PHQ9
1. LITTLE INTEREST OR PLEASURE IN DOING THINGS: 0
SUM OF ALL RESPONSES TO PHQ9 QUESTIONS 1 & 2: 0
2. FEELING DOWN, DEPRESSED OR HOPELESS: 0
SUM OF ALL RESPONSES TO PHQ QUESTIONS 1-9: 0
SUM OF ALL RESPONSES TO PHQ QUESTIONS 1-9: 0

## 2020-04-28 NOTE — PROGRESS NOTES
Temp: 98.1 °F (36.7 °C)    TempSrc: Oral    SpO2: 97%    Weight: 239 lb (108.4 kg)    Height: 5' 6.5\" (1.689 m)      Estimated body mass index is 38 kg/m² as calculated from the following:    Height as of this encounter: 5' 6.5\" (1.689 m). Weight as of this encounter: 239 lb (108.4 kg). Physical Exam  Vitals signs and nursing note reviewed. Constitutional:       General: She is not in acute distress. Appearance: Normal appearance. She is well-developed. She is obese. HENT:      Head: Normocephalic and atraumatic. Right Ear: Tympanic membrane, ear canal and external ear normal.      Left Ear: Tympanic membrane, ear canal and external ear normal.      Nose: Nose normal.      Mouth/Throat:      Mouth: Mucous membranes are moist.   Eyes:      General: No scleral icterus. Pupils: Pupils are equal, round, and reactive to light. Neck:      Musculoskeletal: Normal range of motion and neck supple. Vascular: No carotid bruit. Cardiovascular:      Rate and Rhythm: Normal rate and regular rhythm. Heart sounds: Normal heart sounds. No murmur. Pulmonary:      Effort: Pulmonary effort is normal. No respiratory distress. Breath sounds: Normal breath sounds. No wheezing. Abdominal:      Palpations: Abdomen is soft. Tenderness: There is no abdominal tenderness. Musculoskeletal: Normal range of motion. Lymphadenopathy:      Cervical: No cervical adenopathy. Skin:     General: Skin is warm and dry. Neurological:      Mental Status: She is alert and oriented to person, place, and time. Psychiatric:         Mood and Affect: Mood normal.         Behavior: Behavior normal.         Thought Content: Thought content normal.         Judgment: Judgment normal.         ASSESSMENT/PLAN:    1. Essential hypertension  Elevated  Needs different med  Allergy response to ACEI    - Comprehensive Metabolic Panel; Future  - Lipid Panel; Future  - CBC Auto Differential; Future    2. Obstructive sleep apnea on CPAP  Compliant with use  - CBC Auto Differential; Future    3. Breast cancer screening other than mammogram  due  - SATNAM SCREENING W CAD BILATERAL 2 VW; Future  - Comprehensive Metabolic Panel; Future  - Lipid Panel; Future    4. BMI 38.0-38.9,adult  - TSH with Reflex; Future    5. Screening for thyroid disorder    - TSH with Reflex; Future    6. Adverse reaction to ACE inhibitor drug, initial encounter  Will list as allergy  No further hives today  Stopped medication. Will monitor bp and videovisit in 1 month. Return in about 1 month (around 5/28/2020) for follow up results. An electronic signature was used to authenticate this note.     --PAULIE Kimball - CNP on 4/28/2020 at 5:20 PM

## 2020-05-06 ENCOUNTER — HOSPITAL ENCOUNTER (OUTPATIENT)
Age: 50
Discharge: HOME | End: 2020-05-06
Payer: COMMERCIAL

## 2020-05-06 ENCOUNTER — TELEPHONE (OUTPATIENT)
Dept: PRIMARY CARE CLINIC | Age: 50
End: 2020-05-06

## 2020-05-06 LAB
ABSOLUTE EOS #: 0.1 K/UL (ref 0–0.4)
ABSOLUTE IMMATURE GRANULOCYTE: NORMAL K/UL (ref 0–0.3)
ABSOLUTE LYMPH #: 2.6 K/UL (ref 1–4.8)
ABSOLUTE MONO #: 0.5 K/UL (ref 0–1)
ALBUMIN SERPL-MCNC: 4.7 G/DL (ref 3.5–5.2)
ALBUMIN/GLOBULIN RATIO: ABNORMAL (ref 1–2.5)
ALP BLD-CCNC: 58 U/L (ref 35–104)
ALT SERPL-CCNC: 25 U/L (ref 5–33)
ANION GAP SERPL CALCULATED.3IONS-SCNC: 13 MMOL/L (ref 9–17)
AST SERPL-CCNC: 18 U/L
BASOPHILS # BLD: 1 % (ref 0–2)
BASOPHILS ABSOLUTE: 0 K/UL (ref 0–0.2)
BILIRUB SERPL-MCNC: 0.59 MG/DL (ref 0.3–1.2)
BUN BLDV-MCNC: 11 MG/DL (ref 6–20)
BUN/CREAT BLD: 17 (ref 9–20)
CALCIUM SERPL-MCNC: 10.3 MG/DL (ref 8.6–10.4)
CHLORIDE BLD-SCNC: 107 MMOL/L (ref 98–107)
CHOLESTEROL/HDL RATIO: 4.1
CHOLESTEROL: 201 MG/DL
CO2: 25 MMOL/L (ref 20–31)
CREAT SERPL-MCNC: 0.65 MG/DL (ref 0.5–0.9)
DIFFERENTIAL TYPE: YES
EOSINOPHILS RELATIVE PERCENT: 1 % (ref 0–5)
GFR AFRICAN AMERICAN: >60 ML/MIN
GFR NON-AFRICAN AMERICAN: >60 ML/MIN
GFR SERPL CREATININE-BSD FRML MDRD: ABNORMAL ML/MIN/{1.73_M2}
GFR SERPL CREATININE-BSD FRML MDRD: ABNORMAL ML/MIN/{1.73_M2}
GLUCOSE BLD-MCNC: 112 MG/DL (ref 70–99)
HCT VFR BLD CALC: 41 % (ref 36–46)
HDLC SERPL-MCNC: 49 MG/DL
HEMOGLOBIN: 13.8 G/DL (ref 12–16)
IMMATURE GRANULOCYTES: NORMAL %
LDL CHOLESTEROL: 125 MG/DL (ref 0–130)
LYMPHOCYTES # BLD: 36 % (ref 15–40)
MCH RBC QN AUTO: 29.6 PG (ref 26–34)
MCHC RBC AUTO-ENTMCNC: 33.8 G/DL (ref 31–37)
MCV RBC AUTO: 87.7 FL (ref 80–100)
MONOCYTES # BLD: 6 % (ref 4–8)
NRBC AUTOMATED: NORMAL PER 100 WBC
PATIENT FASTING?: YES
PDW BLD-RTO: 13 % (ref 12.1–15.2)
PLATELET # BLD: 349 K/UL (ref 140–450)
PLATELET ESTIMATE: NORMAL
PMV BLD AUTO: NORMAL FL (ref 6–12)
POTASSIUM SERPL-SCNC: 4.4 MMOL/L (ref 3.7–5.3)
RBC # BLD: 4.67 M/UL (ref 4–5.2)
RBC # BLD: NORMAL 10*6/UL
SEG NEUTROPHILS: 56 % (ref 47–75)
SEGMENTED NEUTROPHILS ABSOLUTE COUNT: 4.1 K/UL (ref 2.5–7)
SODIUM BLD-SCNC: 145 MMOL/L (ref 135–144)
TOTAL PROTEIN: 8 G/DL (ref 6.4–8.3)
TRIGL SERPL-MCNC: 133 MG/DL
TSH SERPL DL<=0.05 MIU/L-ACNC: 2.2 MIU/L (ref 0.3–5)
VLDLC SERPL CALC-MCNC: ABNORMAL MG/DL (ref 1–30)
WBC # BLD: 7.3 K/UL (ref 3.5–11)
WBC # BLD: NORMAL 10*3/UL

## 2020-05-06 PROCEDURE — 36415 COLL VENOUS BLD VENIPUNCTURE: CPT

## 2020-05-06 PROCEDURE — 85025 COMPLETE CBC W/AUTO DIFF WBC: CPT

## 2020-05-06 PROCEDURE — 80061 LIPID PANEL: CPT

## 2020-05-06 PROCEDURE — 80053 COMPREHEN METABOLIC PANEL: CPT

## 2020-05-06 PROCEDURE — 84443 ASSAY THYROID STIM HORMONE: CPT

## 2020-05-07 ENCOUNTER — HOSPITAL ENCOUNTER (OUTPATIENT)
Dept: MAMMOGRAPHY | Age: 50
Discharge: HOME | End: 2020-05-09
Payer: COMMERCIAL

## 2020-05-07 PROCEDURE — 77067 SCR MAMMO BI INCL CAD: CPT

## 2020-09-04 ENCOUNTER — TELEPHONE (OUTPATIENT)
Dept: PRIMARY CARE CLINIC | Age: 50
End: 2020-09-04

## 2020-09-04 NOTE — TELEPHONE ENCOUNTER
Patient states her blood pressure has been high, she is coming in for a visit 09/16    Her current regimen is 0.5 metoprolol 2x daily at 25 this is pending

## 2020-09-10 NOTE — TELEPHONE ENCOUNTER
Unable to lvm , tried contacting pt regarding dose change, she has an appointment next week will inform then unless pharmacy contacted her

## 2020-09-16 ENCOUNTER — OFFICE VISIT (OUTPATIENT)
Dept: PRIMARY CARE CLINIC | Age: 50
End: 2020-09-16
Payer: COMMERCIAL

## 2020-09-16 VITALS — HEART RATE: 103 BPM | BODY MASS INDEX: 36.88 KG/M2 | TEMPERATURE: 98.7 F | OXYGEN SATURATION: 98 % | WEIGHT: 232 LBS

## 2020-09-16 PROCEDURE — G8427 DOCREV CUR MEDS BY ELIG CLIN: HCPCS | Performed by: NURSE PRACTITIONER

## 2020-09-16 PROCEDURE — 99214 OFFICE O/P EST MOD 30 MIN: CPT | Performed by: NURSE PRACTITIONER

## 2020-09-16 PROCEDURE — 1036F TOBACCO NON-USER: CPT | Performed by: NURSE PRACTITIONER

## 2020-09-16 PROCEDURE — 3017F COLORECTAL CA SCREEN DOC REV: CPT | Performed by: NURSE PRACTITIONER

## 2020-09-16 PROCEDURE — G8417 CALC BMI ABV UP PARAM F/U: HCPCS | Performed by: NURSE PRACTITIONER

## 2020-09-16 RX ORDER — SUCRALFATE 1 G/1
1 TABLET ORAL
Qty: 60 TABLET | Refills: 1 | Status: SHIPPED | OUTPATIENT
Start: 2020-09-16 | End: 2021-04-27 | Stop reason: SDUPTHER

## 2020-09-16 RX ORDER — METOPROLOL SUCCINATE 100 MG/1
100 TABLET, EXTENDED RELEASE ORAL DAILY
Qty: 30 TABLET | Refills: 2 | Status: SHIPPED | OUTPATIENT
Start: 2020-09-16 | End: 2020-12-16

## 2020-09-16 RX ORDER — DOXAZOSIN 2 MG/1
2 TABLET ORAL DAILY
Qty: 30 TABLET | Refills: 1 | Status: SHIPPED | OUTPATIENT
Start: 2020-09-16 | End: 2020-12-08 | Stop reason: SDUPTHER

## 2020-09-16 RX ORDER — PREDNISONE 10 MG/1
TABLET ORAL
Qty: 18 TABLET | Refills: 0 | Status: SHIPPED | OUTPATIENT
Start: 2020-09-16 | End: 2020-12-10

## 2020-09-16 ASSESSMENT — ENCOUNTER SYMPTOMS
HEARTBURN: 1
ORTHOPNEA: 0
SHORTNESS OF BREATH: 0
BLURRED VISION: 0

## 2020-09-16 NOTE — PATIENT INSTRUCTIONS
You may be receiving a survey from [employer] regarding your visit today. You may get this in the mail, through your MyChart or in your email. Please complete the survey to enable us to provide the highest quality of care to you and your family. If you cannot score us as very good ( 5 Stars) on any question, please feel free to call the office to discuss how we could have made your experience exceptional.     Thank You! PAULIE Paredes-CNP  [address]  [place], [place]

## 2020-09-16 NOTE — PROGRESS NOTES
2020     Seda Jean-Baptiste (:  1970) is a 48 y.o. female, here for evaluation of the following medical concerns:    bp control remains resistant. Metoprolol XL 100mg daily in am and will start cardura 2 mg daily. Eye exam needs updated. Pt daughter ill recently adding stress. Hypertension   This is a chronic problem. The current episode started more than 1 year ago. The problem is resistant. Associated symptoms include peripheral edema. Pertinent negatives include no anxiety, blurred vision, headaches, neck pain, orthopnea, palpitations or shortness of breath. Agents associated with hypertension include oral contraceptives. Past treatments include beta blockers. Heartburn   She complains of heartburn. Right sided low back pain with right foot/heel radiculopathy. L5 dermatome. No injury    REINALDO compliant with use of CPAP. Needs new mask. GERD on nexium and carafate. Obesity BMI 36 reduce portion sizes. Review of Systems   Constitutional: Negative for activity change, chills and fever. HENT: Negative for congestion, rhinorrhea and sinus pressure. Eyes: Negative. Negative for blurred vision. Respiratory: Negative for chest tightness and shortness of breath. Cardiovascular: Negative for palpitations and orthopnea. Gastrointestinal: Positive for heartburn. Endocrine: Negative for cold intolerance and heat intolerance. Genitourinary: Negative for difficulty urinating. Musculoskeletal: Negative for arthralgias and neck pain. Neurological: Negative for dizziness and headaches. Psychiatric/Behavioral: Negative for agitation. Prior to Visit Medications    Medication Sig Taking?  Authorizing Provider   doxazosin (CARDURA) 2 MG tablet Take 1 tablet by mouth daily In evening for hypertension Yes PAULIE Ansari - CNP   metoprolol succinate (TOPROL XL) 100 MG extended release tablet Take 1 tablet by mouth daily AM for blood pressure Yes Brooks Mendes PAULIE Pedro - CNP   sucralfate (CARAFATE) 1 GM tablet Take 1 tablet by mouth 2 times daily (before meals) Indications: 30 minutes before meal Yes PAULIE Fabian - ASNJUANITA   esomeprazole (NEXIUM 24HR) 20 MG delayed release capsule Take 1 capsule by mouth every morning (before breakfast) For GERD Yes Jun Sebastian APRN - CNP   predniSONE (DELTASONE) 10 MG tablet Take 3 tabs by mouth daily by mouth x 3 days with food then 2 tabs x 3 days then 1 tab x 3 days for lumbar L5 radiculopathy Yes Latriciaealouie Sebastian APRN - SANJUANITA   metoprolol tartrate (LOPRESSOR) 25 MG tablet Take 1 tablet by mouth 2 times daily For hypertension Yes PAULIE Fabian CNP        Social History     Tobacco Use    Smoking status: Never Smoker    Smokeless tobacco: Never Used   Substance Use Topics    Alcohol use: No        Vitals:    09/16/20 1619   Pulse: 103   Temp: 98.7 °F (37.1 °C)   TempSrc: Infrared   SpO2: 98%   Weight: 232 lb (105.2 kg)     Estimated body mass index is 36.88 kg/m² as calculated from the following:    Height as of 4/28/20: 5' 6.5\" (1.689 m). Weight as of this encounter: 232 lb (105.2 kg). Physical Exam  Vitals signs and nursing note reviewed. Constitutional:       General: She is not in acute distress. Appearance: Normal appearance. She is well-developed. HENT:      Head: Normocephalic and atraumatic. Right Ear: Tympanic membrane and external ear normal.      Left Ear: Tympanic membrane and external ear normal.      Nose: No congestion. Mouth/Throat:      Mouth: Mucous membranes are moist.   Eyes:      General: No scleral icterus. Pupils: Pupils are equal, round, and reactive to light. Neck:      Musculoskeletal: Normal range of motion and neck supple. Vascular: No carotid bruit (neg yessica). Cardiovascular:      Rate and Rhythm: Normal rate and regular rhythm. Heart sounds: Normal heart sounds. No murmur. Pulmonary:      Effort: Pulmonary effort is normal. No respiratory distress. Breath sounds: Normal breath sounds. No wheezing. Abdominal:      Palpations: Abdomen is soft. Tenderness: There is no abdominal tenderness. Musculoskeletal: Normal range of motion. General: Tenderness (right paraspinal tenderness including SI joint. ) present. Lymphadenopathy:      Cervical: No cervical adenopathy. Skin:     General: Skin is warm and dry. Neurological:      Mental Status: She is alert and oriented to person, place, and time. Deep Tendon Reflexes: Reflexes abnormal (right patellar reflex 1+, Left 2+ achilles intact 2+ yessica). Psychiatric:         Behavior: Behavior normal.         Thought Content: Thought content normal.         Judgment: Judgment normal.         ASSESSMENT/PLAN:  1. Acute right-sided low back pain with right-sided sciatica  Gentle stretching. Steroid taper with food    - XR LUMBAR SPINE (MIN 4 VIEWS); Future  - [facility]    2. Lumbosacral radiculopathy at L5  - XR LUMBAR SPINE (MIN 4 VIEWS); [facility]  - [facility]    3. Essential hypertension  Resistant will add cardura  - doxazosin (CARDURA) 2 MG tablet; Take 1 tablet by mouth daily In evening for hypertension  Dispense: 30 tablet; Refill: 1  - metoprolol succinate (TOPROL XL) 100 MG extended release tablet; Take 1 tablet by mouth daily AM for blood pressure  Dispense: 30 tablet; Refill: 2    4. Obstructive sleep apnea on CPAP  Compliant with use      5. BMI 38.0-38.9,adult  Limit portion sizes. 6. Gastroesophageal reflux disease without esophagitis    - sucralfate (CARAFATE) 1 GM tablet; Take 1 tablet by mouth 2 times daily (before meals) Indications: 30 minutes before meal  Dispense: 60 tablet; Refill: 1      Return in about 4 weeks (around 10/14/2020) for HTN check. An electronic signature was used to authenticate this note.     --PAULIE Armas - CNP on 9/19/2020 at 8:18 AM

## 2020-09-19 ASSESSMENT — ENCOUNTER SYMPTOMS
RHINORRHEA: 0
CHEST TIGHTNESS: 0
EYES NEGATIVE: 1
SINUS PRESSURE: 0

## 2020-09-22 ENCOUNTER — HOSPITAL ENCOUNTER (OUTPATIENT)
Age: 50
Discharge: HOME | End: 2020-09-24
Payer: COMMERCIAL

## 2020-09-22 ENCOUNTER — HOSPITAL ENCOUNTER (OUTPATIENT)
Dept: GENERAL RADIOLOGY | Age: 50
Discharge: HOME | End: 2020-09-24
Payer: COMMERCIAL

## 2020-09-22 PROCEDURE — 72110 X-RAY EXAM L-2 SPINE 4/>VWS: CPT

## 2020-09-23 ENCOUNTER — TELEPHONE (OUTPATIENT)
Dept: PRIMARY CARE CLINIC | Age: 50
End: 2020-09-23

## 2020-09-23 RX ORDER — CYCLOBENZAPRINE HCL 10 MG
10 TABLET ORAL NIGHTLY PRN
Qty: 10 TABLET | Refills: 0 | Status: SHIPPED | OUTPATIENT
Start: 2020-09-23 | End: 2020-10-23

## 2020-11-06 RX ORDER — SPIRONOLACTONE 25 MG/1
25 TABLET ORAL DAILY
Qty: 14 TABLET | Refills: 0 | Status: SHIPPED | OUTPATIENT
Start: 2020-11-06 | End: 2021-04-29 | Stop reason: ALTCHOICE

## 2020-11-07 ENCOUNTER — HOSPITAL ENCOUNTER (OUTPATIENT)
Age: 50
Discharge: HOME | End: 2020-11-07
Payer: COMMERCIAL

## 2020-11-07 LAB
ANION GAP SERPL CALCULATED.3IONS-SCNC: 10 MMOL/L (ref 9–17)
BUN BLDV-MCNC: 13 MG/DL (ref 6–20)
BUN/CREAT BLD: 17 (ref 9–20)
CALCIUM SERPL-MCNC: 9.1 MG/DL (ref 8.6–10.4)
CHLORIDE BLD-SCNC: 108 MMOL/L (ref 98–107)
CO2: 22 MMOL/L (ref 20–31)
CREAT SERPL-MCNC: 0.75 MG/DL (ref 0.5–0.9)
GFR AFRICAN AMERICAN: >60 ML/MIN
GFR NON-AFRICAN AMERICAN: >60 ML/MIN
GFR SERPL CREATININE-BSD FRML MDRD: ABNORMAL ML/MIN/{1.73_M2}
GFR SERPL CREATININE-BSD FRML MDRD: ABNORMAL ML/MIN/{1.73_M2}
GLUCOSE BLD-MCNC: 105 MG/DL (ref 70–99)
POTASSIUM SERPL-SCNC: 4.3 MMOL/L (ref 3.7–5.3)
SODIUM BLD-SCNC: 140 MMOL/L (ref 135–144)

## 2020-11-07 PROCEDURE — 36415 COLL VENOUS BLD VENIPUNCTURE: CPT

## 2020-11-07 PROCEDURE — 80048 BASIC METABOLIC PNL TOTAL CA: CPT

## 2020-11-19 ENCOUNTER — HOSPITAL ENCOUNTER (OUTPATIENT)
Age: 50
Discharge: HOME | End: 2020-11-21
Payer: COMMERCIAL

## 2020-11-19 ENCOUNTER — HOSPITAL ENCOUNTER (OUTPATIENT)
Dept: NON INVASIVE DIAGNOSTICS | Age: 50
Discharge: HOME | End: 2020-11-19
Payer: COMMERCIAL

## 2020-11-19 ENCOUNTER — HOSPITAL ENCOUNTER (OUTPATIENT)
Age: 50
Discharge: HOME | End: 2020-11-19
Payer: COMMERCIAL

## 2020-11-19 ENCOUNTER — HOSPITAL ENCOUNTER (OUTPATIENT)
Dept: GENERAL RADIOLOGY | Age: 50
Discharge: HOME | End: 2020-11-21
Payer: COMMERCIAL

## 2020-11-19 LAB
ABSOLUTE EOS #: 0.2 K/UL (ref 0–0.4)
ABSOLUTE IMMATURE GRANULOCYTE: ABNORMAL K/UL (ref 0–0.3)
ABSOLUTE LYMPH #: 2.3 K/UL (ref 1–4.8)
ABSOLUTE MONO #: 0.3 K/UL (ref 0–1)
ALBUMIN SERPL-MCNC: 5 G/DL (ref 3.5–5.2)
ALBUMIN/GLOBULIN RATIO: ABNORMAL (ref 1–2.5)
ALP BLD-CCNC: 51 U/L (ref 35–104)
ALT SERPL-CCNC: 55 U/L (ref 5–33)
ANION GAP SERPL CALCULATED.3IONS-SCNC: 12 MMOL/L (ref 9–17)
AST SERPL-CCNC: 29 U/L
BASOPHILS # BLD: 1 % (ref 0–2)
BASOPHILS ABSOLUTE: 0 K/UL (ref 0–0.2)
BILIRUB SERPL-MCNC: 0.54 MG/DL (ref 0.3–1.2)
BUN BLDV-MCNC: 13 MG/DL (ref 6–20)
BUN/CREAT BLD: 18 (ref 9–20)
CALCIUM SERPL-MCNC: 10.2 MG/DL (ref 8.6–10.4)
CHLORIDE BLD-SCNC: 104 MMOL/L (ref 98–107)
CHOLESTEROL/HDL RATIO: 5
CHOLESTEROL: 171 MG/DL
CO2: 24 MMOL/L (ref 20–31)
CREAT SERPL-MCNC: 0.72 MG/DL (ref 0.5–0.9)
DIFFERENTIAL TYPE: YES
EOSINOPHILS RELATIVE PERCENT: 3 % (ref 0–5)
GFR AFRICAN AMERICAN: >60 ML/MIN
GFR NON-AFRICAN AMERICAN: >60 ML/MIN
GFR SERPL CREATININE-BSD FRML MDRD: ABNORMAL ML/MIN/{1.73_M2}
GFR SERPL CREATININE-BSD FRML MDRD: ABNORMAL ML/MIN/{1.73_M2}
GLUCOSE BLD-MCNC: 100 MG/DL (ref 70–99)
HCT VFR BLD CALC: 39.4 % (ref 36–46)
HDLC SERPL-MCNC: 34 MG/DL
HEMOGLOBIN: 13.6 G/DL (ref 12–16)
IMMATURE GRANULOCYTES: ABNORMAL %
LDL CHOLESTEROL: 116 MG/DL (ref 0–130)
LV EF: 55 %
LVEF MODALITY: NORMAL
LYMPHOCYTES # BLD: 41 % (ref 15–40)
MAGNESIUM: 1.9 MG/DL (ref 1.6–2.6)
MCH RBC QN AUTO: 30 PG (ref 26–34)
MCHC RBC AUTO-ENTMCNC: 34.4 G/DL (ref 31–37)
MCV RBC AUTO: 87.3 FL (ref 80–100)
MONOCYTES # BLD: 6 % (ref 4–8)
NRBC AUTOMATED: ABNORMAL PER 100 WBC
PATIENT FASTING?: YES
PDW BLD-RTO: 13.4 % (ref 12.1–15.2)
PLATELET # BLD: 327 K/UL (ref 140–450)
PLATELET ESTIMATE: ABNORMAL
PMV BLD AUTO: ABNORMAL FL (ref 6–12)
POTASSIUM SERPL-SCNC: 4.2 MMOL/L (ref 3.7–5.3)
RBC # BLD: 4.51 M/UL (ref 4–5.2)
RBC # BLD: ABNORMAL 10*6/UL
SEG NEUTROPHILS: 49 % (ref 47–75)
SEGMENTED NEUTROPHILS ABSOLUTE COUNT: 2.8 K/UL (ref 2.5–7)
SODIUM BLD-SCNC: 140 MMOL/L (ref 135–144)
TOTAL PROTEIN: 7.8 G/DL (ref 6.4–8.3)
TRIGL SERPL-MCNC: 106 MG/DL
TSH SERPL DL<=0.05 MIU/L-ACNC: 2.49 MIU/L (ref 0.3–5)
VITAMIN D 25-HYDROXY: 42.2 NG/ML (ref 30–100)
VLDLC SERPL CALC-MCNC: ABNORMAL MG/DL (ref 1–30)
WBC # BLD: 5.6 K/UL (ref 3.5–11)
WBC # BLD: ABNORMAL 10*3/UL

## 2020-11-19 PROCEDURE — 71046 X-RAY EXAM CHEST 2 VIEWS: CPT

## 2020-11-19 PROCEDURE — 36415 COLL VENOUS BLD VENIPUNCTURE: CPT

## 2020-11-19 PROCEDURE — 93005 ELECTROCARDIOGRAM TRACING: CPT

## 2020-11-19 PROCEDURE — 83735 ASSAY OF MAGNESIUM: CPT

## 2020-11-19 PROCEDURE — 82306 VITAMIN D 25 HYDROXY: CPT

## 2020-11-19 PROCEDURE — 80061 LIPID PANEL: CPT

## 2020-11-19 PROCEDURE — 93306 TTE W/DOPPLER COMPLETE: CPT

## 2020-11-19 PROCEDURE — 85025 COMPLETE CBC W/AUTO DIFF WBC: CPT

## 2020-11-19 PROCEDURE — 80053 COMPREHEN METABOLIC PANEL: CPT

## 2020-11-19 PROCEDURE — 84443 ASSAY THYROID STIM HORMONE: CPT

## 2020-11-21 LAB
EKG ATRIAL RATE: 71 BPM
EKG P AXIS: 57 DEGREES
EKG P-R INTERVAL: 132 MS
EKG Q-T INTERVAL: 420 MS
EKG QRS DURATION: 96 MS
EKG QTC CALCULATION (BAZETT): 456 MS
EKG R AXIS: 40 DEGREES
EKG T AXIS: 39 DEGREES
EKG VENTRICULAR RATE: 71 BPM

## 2020-11-21 PROCEDURE — 93010 ELECTROCARDIOGRAM REPORT: CPT | Performed by: INTERNAL MEDICINE

## 2020-12-08 RX ORDER — DOXAZOSIN 2 MG/1
2 TABLET ORAL DAILY
Qty: 30 TABLET | Refills: 1 | Status: SHIPPED | OUTPATIENT
Start: 2020-12-08 | End: 2020-12-10 | Stop reason: ALTCHOICE

## 2020-12-08 NOTE — TELEPHONE ENCOUNTER
Pending medication.     Health Maintenance   Topic Date Due    DTaP/Tdap/Td vaccine (1 - Tdap) 05/09/1989    Cervical cancer screen  04/18/2019    Shingles Vaccine (1 of 2) 05/09/2020    Colon cancer screen colonoscopy  05/09/2020    Flu vaccine (1) 09/01/2020    Potassium monitoring  11/19/2021    Creatinine monitoring  11/19/2021    Breast cancer screen  05/07/2022    Lipid screen  11/19/2025    HIV screen  Addressed    Hepatitis A vaccine  Aged Out    Hepatitis B vaccine  Aged Out    Hib vaccine  Aged Out    Meningococcal (ACWY) vaccine  Aged Out    Pneumococcal 0-64 years Vaccine  Aged Out             (applicable per patient's age: Cancer Screenings, Depression Screening, Fall Risk Screening, Immunizations)    Hemoglobin A1C (%)   Date Value   01/25/2018 5.3     LDL Cholesterol (mg/dL)   Date Value   11/19/2020 116     AST (U/L)   Date Value   11/19/2020 29     ALT (U/L)   Date Value   11/19/2020 55 (H)     BUN (mg/dL)   Date Value   11/19/2020 13      (goal A1C is < 7)   (goal LDL is <100) need 30-50% reduction from baseline     BP Readings from Last 3 Encounters:   04/28/20 (!) 160/110   03/22/18 (!) 150/91   01/22/18 128/78    (goal /80)      All Future Testing planned in [facility]:  Lab Frequency Next Occurrence       Next Visit Date:  Future Appointments   Date Time Provider German Harryi   12/10/2020  2:30 PM Claudette Clemente MD [facility]   3/10/2021  4:30 PM [facility]            Patient Active Problem List:     Obstructive sleep apnea on CPAP     Essential hypertension     Biliary dyskinesia

## 2020-12-10 ENCOUNTER — OFFICE VISIT (OUTPATIENT)
Dept: CARDIOLOGY CLINIC | Age: 50
End: 2020-12-10
Payer: COMMERCIAL

## 2020-12-10 VITALS
BODY MASS INDEX: 36.88 KG/M2 | SYSTOLIC BLOOD PRESSURE: 160 MMHG | DIASTOLIC BLOOD PRESSURE: 100 MMHG | OXYGEN SATURATION: 96 % | WEIGHT: 232 LBS | HEART RATE: 70 BPM

## 2020-12-10 PROCEDURE — 3017F COLORECTAL CA SCREEN DOC REV: CPT | Performed by: INTERNAL MEDICINE

## 2020-12-10 PROCEDURE — G8484 FLU IMMUNIZE NO ADMIN: HCPCS | Performed by: INTERNAL MEDICINE

## 2020-12-10 PROCEDURE — G8417 CALC BMI ABV UP PARAM F/U: HCPCS | Performed by: INTERNAL MEDICINE

## 2020-12-10 PROCEDURE — 1036F TOBACCO NON-USER: CPT | Performed by: INTERNAL MEDICINE

## 2020-12-10 PROCEDURE — 99204 OFFICE O/P NEW MOD 45 MIN: CPT | Performed by: INTERNAL MEDICINE

## 2020-12-10 PROCEDURE — G8427 DOCREV CUR MEDS BY ELIG CLIN: HCPCS | Performed by: INTERNAL MEDICINE

## 2020-12-10 RX ORDER — DOXAZOSIN 2 MG/1
2 TABLET ORAL 2 TIMES DAILY
COMMUNITY
End: 2020-12-10 | Stop reason: SDUPTHER

## 2020-12-10 RX ORDER — DOXAZOSIN 2 MG/1
2 TABLET ORAL 2 TIMES DAILY
Qty: 60 TABLET | Refills: 11 | Status: SHIPPED | OUTPATIENT
Start: 2020-12-10 | End: 2021-10-04 | Stop reason: SDUPTHER

## 2020-12-10 NOTE — LETTER
Celine Brito M.D. [address] [place], [place] [address]   [phone]        December 10, 2020        Roseann Canada, CNP  [address]    RE:   Oral Gibbs  :  1970    Dear Jennifer Hard:    CHIEF COMPLAINT:  1. Hypertension. 2.  Chest pain, somewhat atypical.    HISTORY OF PRESENT ILLNESS:  I had the pleasure of seeing Ms. Regan Gonzalez in the office on 12/10/2020. She is a pleasant 40-year-old female, who happens to be the aunt of Martine Hardy, who works in material management. Ms. Regan Gonzalez has had a history of hypertension for many years, which has been well controlled. However, in approximately October, her blood pressure began elevating and she began getting short of breath with exertion. At the same time, she developed some pedal edema. She was given Aldactone, which helped her edema. She has been having also chest pain for the past 1 to 2 months. It radiates from her precordium to her back and up the jaw with some \"numbness in the face. \"  Pain is described as a sharp discomfort and it is not necessarily associated with any activity. It can come at any time during the day or night. She is active and works for [employer]. She has had some difficulty with being able to do her job because of shortness of breath. She has a history of sleep apnea and has been on the CPAP mask for the last 4 years. However, in the last month, she has been unable to wear it due to shortness of breath. She denies palpitations. She has had no syncope or near syncope. She has had no change in bowel habits. She has occasional diarrhea, which is not uncommon or unusual.    She has noticed some \"flushing spells,\" where she becomes diaphoretic. She has not taken her blood pressure during these episodes. She has never had any cardiac problems in the past.  She has been essentially unlimited in her activity. My nurse, Sheeba Tucker, walked her in the hallway. Her O2 saturation remained at 95% to 96%. She had shortness of breath and her legs \"felt weak;\" however, she was able to walk only 2 laps. She did have an echocardiogram that showed normal LV function, ejection fraction of 55%, with normal right atrium, right ventricle size and function. She had mild mitral regurgitation. The echo was technically difficult. She has never had a myocardial infarction or cardiac catheterization or stress test.    CARDIAC RISK FACTORS:  Hypertension:  Positive. Hyperlipidemia:  Mildly positive. Other Family Members:  Mildly positive, with her mother having an MI at age 67. Diabetes:  Negative. Peripheral Vascular Disease:  Negative. Smoking:  Negative. MEDICATIONS AT THIS TIME:  She is on Cardura 2 mg daily, Nexium 20 mg daily, Toprol- mg daily, spironolactone 25 mg daily, Carafate 1 gm b.i.d. PAST MEDICAL AND SURGICAL HISTORY:  1. She has had a long history of hypertension. 2.  She has sleep apnea and has been on a CPAP mask for the last 4 years. She has had difficulty wearing it for the last several months and has not been wearing it at all in the last month. 3.  History of GERD. 4.  Headaches. 5.  Anxiety. 6.  She has had a hysterectomy and a cholecystectomy on 06/14/2016. FAMILY HISTORY:  Mother had a myocardial infarction at age 67. SOCIAL HISTORY:  She is 48years old, . She works in [employer]. Has 2 children, a 59-year-old son and a 80-year-old daughter. Does not smoke. She does have sleep apnea and has had a CPAP mask for 4 years although not worn it in the last month. She is the aunt of Mariely Will, who works in material management. Does not exercise. REVIEW OF SYSTEMS:  Cardiac as above. Other systems reviewed including constitutional, eyes, ears, nose and throat, cardiovascular, respiratory, GI, , musculoskeletal, integumentary, neurologic, endocrine, hematologic and allergic/immunologic are negative except for what is described above. No weight loss or weight gain. No change in bowel habits. No blood in stools. No fevers, sweats or chills. PHYSICAL EXAMINATION:  VITAL SIGNS:  Her blood pressure was 160/100, with a heart rate of 70 and regular. Respiratory rate 18. O2 sat 96%. Weight 232 pounds. GENERAL:  She is a pleasant 51-year-old female. Denied pain. She was oriented to person, place and time. Answered questions appropriately. Accompanied by her niece, Tavon Antoine. SKIN:  No unusual skin changes. HEENT:  The pupils are equally round and intact. Mucous membranes were dry. NECK:  No JVD. Good carotid pulses. No carotid bruits. No lymphadenopathy or thyromegaly. CARDIOVASCULAR EXAM:  S1 and S2 were normal.  No S3 or S4. Soft systolic blowing type murmur. No diastolic murmur. PMI was normal.  No lift, thrust, or pericardial friction rub. LUNGS:  Quite clear to auscultation and percussion. ABDOMEN:  Soft and nontender. Good bowel sounds. EXTREMITIES:  Good femoral pulses. Good pedal pulses. No pedal edema. Skin was warm and dry. No calf tenderness. Nail beds pink. Good cap refill. PULSES:  Bilateral symmetrical radial, brachial and carotid pulses. No carotid bruits. Good femoral and pedal pulses. NEUROLOGIC EXAM:  Within normal limits. PSYCHIATRIC EXAM:  Within normal limits. LABORATORY DATA:  Sodium was 140, potassium 4.2, BUN 13, creatinine 0.72, GFR greater than 60, magnesium 1.9, glucose 100, calcium was 10.2. Cholesterol 171 with an HDL of 34, , triglycerides 106. ALT was 55, AST was 29. TSH was 2.49. Vitamin D was 42.2. White count 5.6, hemoglobin 13.6 with a platelet count [phone]. EKG showed normal sinus rhythm and was normal.    Chest x-ray was unremarkable. Echocardiogram showed normal LV size and function, with an ejection fraction of 55% although visualization was difficult. IMPRESSION:  1. Long history of hypertension, with accelerated hypertension starting in approximately October, accompanied by shortness of breath. 2.  Chest pain although somewhat atypical, being sharp in character and not necessarily related to activity. 3.  Sleep apnea, on a CPAP mask for 4 years although has not been using it consistently in the last 3 months and not at all in the last 1 month because of shortness of breath. 4.  Normal O2 saturation of 95%, with walking in the hallway although was fairly short of breath. 5.  Mild hyperlipidemia. PLAN:  1.  A 24-hour urine for metanephrines and catecholamines as well as renin and aldosterone. 2.  Increase Cardura 2 mg b.i.d.  3.  We will do treadmill stress test with O2 saturations. 4.  We will do STARLA and CRP. 5.  We will see her back in January. DISCUSSION:  Ms. Elsa Hess has had hypertension for several years, which has been controlled. However, in October, it began to be accelerated and has been in the 160/100 range. She has also developed some flushing episodes, although it is difficult to get a good history, time or exact symptoms that she is experiencing. Her blood pressure is still fairly elevated in spite of the addition of Cardura. I will rule out pheochromocytoma and we will also do a sed rate and CRP along with renin and aldosterone. We will increase her Cardura to 2 mg b.i.d. and we will see her in 1 week for a blood pressure check and a full evaluation in January. I suspect that we will need to increase her Cardura up to 8 mg b.i.d. If she is still elevated on a maximum dose of Cardura, would add ACE inhibitor, such as lisinopril or ramipril. If she is still uncontrolled with maximum dose of Toprol, Cardura and an ACE inhibitor, then would do an MRA of her renal arteries. It is somewhat unusual that she would start developing her accelerated hypertension only in the last 3 months. Of note, also however she has not been using her CPAP mask. I suspect that she will need to be fitted with a new mask. Thank you very much for allowing me the privilege of seeing Mrs. Elsa Hess. I will be there for her stress test and will be able to see her shortness of breath and measure her O2 saturation while she is on the treadmill.     Sincerely,      January Marcus    D: 12/13/2020 20:17:15     T: 12/13/2020 20:24:12     [email or web address]  Job#: [id number]   Doc#: [id number]

## 2020-12-10 NOTE — PROGRESS NOTES
Ov DR Vandana Cowart consult  C/o chest pain for past   Couple month. Radiates to back   And up jaw feels  Numbness in face. Sharp pain. C/o headache.  yessica ankle edema   Started same time   Better with the aldactone. C/o sob with exertion  HTN for along time   But uncontrolled for past  Couple months also. No history heart problems  family :mother MI age 67  Most family htn.  1 brother 10 sisters. Bud Chavez (works material management)   niece with pt   Works [employer]. Has 2 children 32 and 34   Son and daughter. Has sleep apnea   Wears cpap mask   For past 4 year   Last month unable to   Wear it due to sob  Pulse ox room air 96%  Walked in [place] 2 laps   Remained 95-96%  C/o some sob and legs  Feel weak. .   Will increase cardura 2mg to   Bid. Will do 24 hr urine testing    catecolamines and metanephrines.   Sed rate STARLA crp   And exercise stress test  See back in [facility]

## 2020-12-11 ENCOUNTER — HOSPITAL ENCOUNTER (OUTPATIENT)
Age: 50
Discharge: HOME | End: 2020-12-11
Payer: COMMERCIAL

## 2020-12-11 LAB
C-REACTIVE PROTEIN: 1.6 MG/L (ref 0–5)
SEDIMENTATION RATE, ERYTHROCYTE: 7 MM (ref 0–30)

## 2020-12-11 PROCEDURE — 86225 DNA ANTIBODY NATIVE: CPT

## 2020-12-11 PROCEDURE — 85652 RBC SED RATE AUTOMATED: CPT

## 2020-12-11 PROCEDURE — 86140 C-REACTIVE PROTEIN: CPT

## 2020-12-11 PROCEDURE — 86235 NUCLEAR ANTIGEN ANTIBODY: CPT

## 2020-12-11 PROCEDURE — 36415 COLL VENOUS BLD VENIPUNCTURE: CPT

## 2020-12-13 ENCOUNTER — HOSPITAL ENCOUNTER (OUTPATIENT)
Age: 50
Setting detail: SPECIMEN
Discharge: HOME | End: 2020-12-13
Payer: COMMERCIAL

## 2020-12-13 PROCEDURE — 83835 ASSAY OF METANEPHRINES: CPT

## 2020-12-13 PROCEDURE — 82384 ASSAY THREE CATECHOLAMINES: CPT

## 2020-12-14 LAB
ANA REFERENCE RANGE:: NORMAL
ANTI DNA DOUBLE STRANDED: 11 IU/ML
ANTI JO-1 IGG: 61 U/ML
ANTI RNP AB: 27 U/ML
ANTI SSA: 8 U/ML
ANTI SSB: 7 U/ML
ANTI-CENTROMERE: 2 U/ML
ANTI-SCLERODERMA: 9 U/ML
ANTI-SMITH: 7 U/ML
HISTONE ANTIBODY: 8 U/ML

## 2020-12-14 NOTE — PROGRESS NOTES
Cande Fischer M.D. [address] [place] [address]  [phone]        December 10, 2020        Jacquie Stewart, CNP  [address] [address]    RE:   Ab Toth  :  1970    Dear Missy Blankenship:    CHIEF COMPLAINT:  1. Hypertension. 2.  Chest pain, somewhat atypical.    HISTORY OF PRESENT ILLNESS:  I had the pleasure of seeing Ms. Lakesha Ferrara in the office on 12/10/2020. She is a pleasant 55-year-old female, who happens to be the aunt of Yesica Ellis, who works in material management. Ms. Lakesha Ferrara has had a history of hypertension for many years, which has been well controlled. However, in approximately October, her blood pressure began elevating and she began getting short of breath with exertion. At the same time, she developed some pedal edema. She was given Aldactone, which helped her edema. She has been having also chest pain for the past 1 to 2 months. It radiates from her precordium to her back and up the jaw with some \"numbness in the face. \"  Pain is described as a sharp discomfort and it is not necessarily associated with any activity. It can come at any time during the day or night. She is active and works for [employer]. She has had some difficulty with being able to do her job because of shortness of breath. She has a history of sleep apnea and has been on the CPAP mask for the last 4 years. However, in the last month, she has been unable to wear it due to shortness of breath. She denies palpitations. She has had no syncope or near syncope. She has had no change in bowel habits. She has occasional diarrhea, which is not uncommon or unusual.    She has noticed some \"flushing spells,\" where she becomes diaphoretic. She has not taken her blood pressure during these episodes. She has never had any cardiac problems in the past.  She has been essentially unlimited in her activity. My nurse, Sujey Frausto, walked her in the hallway. Her O2 saturation remained at 95% to 96%. She had shortness of breath and her legs \"felt weak;\" however, she was able to walk only 2 laps. She did have an echocardiogram that showed normal LV function, ejection fraction of 55%, with normal right atrium, right ventricle size and function. She had mild mitral regurgitation. The echo was technically difficult. She has never had a myocardial infarction or cardiac catheterization or stress test.    CARDIAC RISK FACTORS:  Hypertension:  Positive. Hyperlipidemia:  Mildly positive. Other Family Members:  Mildly positive, with her mother having an MI at age 67. Diabetes:  Negative. Peripheral Vascular Disease:  Negative. Smoking:  Negative. MEDICATIONS AT THIS TIME:  She is on Cardura 2 mg daily, Nexium 20 mg daily, Toprol- mg daily, spironolactone 25 mg daily, Carafate 1 gm b.i.d. PAST MEDICAL AND SURGICAL HISTORY:  1. She has had a long history of hypertension. 2.  She has sleep apnea and has been on a CPAP mask for the last 4 years. She has had difficulty wearing it for the last several months and has not been wearing it at all in the last month. 3.  History of GERD. 4.  Headaches. 5.  Anxiety. 6.  She has had a hysterectomy and a cholecystectomy on 06/14/2016. FAMILY HISTORY:  Mother had a myocardial infarction at age 67. SOCIAL HISTORY:  She is 48years old, . She works in [employer]. Has 2 children, a 77-year-old son and a 77-year-old daughter. Does not smoke. She does have sleep apnea and has had a CPAP mask for 4 years although not worn it in the last month. She is the aunt of Rogelio Do, who works in material management. Does not exercise. REVIEW OF SYSTEMS:  Cardiac as above. Other systems reviewed including constitutional, eyes, ears, nose and throat, cardiovascular, respiratory, GI, , musculoskeletal, integumentary, neurologic, endocrine, hematologic and allergic/immunologic are negative except for what is described above. No weight loss or weight gain. No change in bowel habits. No blood in stools. No fevers, sweats or chills. PHYSICAL EXAMINATION:  VITAL SIGNS:  Her blood pressure was 160/100, with a heart rate of 70 and regular. Respiratory rate 18. O2 sat 96%. Weight 232 pounds. GENERAL:  She is a pleasant 30-year-old female. Denied pain. She was oriented to person, place and time. Answered questions appropriately. Accompanied by her niece, Deysi Grove. SKIN:  No unusual skin changes. HEENT:  The pupils are equally round and intact. Mucous membranes were dry. NECK:  No JVD. Good carotid pulses. No carotid bruits. No lymphadenopathy or thyromegaly. CARDIOVASCULAR EXAM:  S1 and S2 were normal.  No S3 or S4. Soft systolic blowing type murmur. No diastolic murmur. PMI was normal.  No lift, thrust, or pericardial friction rub. LUNGS:  Quite clear to auscultation and percussion. ABDOMEN:  Soft and nontender. Good bowel sounds. EXTREMITIES:  Good femoral pulses. Good pedal pulses. No pedal edema. Skin was warm and dry. No calf tenderness. Nail beds pink. Good cap refill. PULSES:  Bilateral symmetrical radial, brachial and carotid pulses. No carotid bruits. Good femoral and pedal pulses. NEUROLOGIC EXAM:  Within normal limits. PSYCHIATRIC EXAM:  Within normal limits. LABORATORY DATA:  Sodium was 140, potassium 4.2, BUN 13, creatinine 0.72, GFR greater than 60, magnesium 1.9, glucose 100, calcium was 10.2. Cholesterol 171 with an HDL of 34, , triglycerides 106. ALT was 55, AST was 29. TSH was 2.49. Vitamin D was 42.2. White count 5.6, hemoglobin 13.6 with a platelet count 584,574. EKG showed normal sinus rhythm and was normal.    Chest x-ray was unremarkable. Echocardiogram showed normal LV size and function, with an ejection fraction of 55% although visualization was difficult. IMPRESSION:  1. Long history of hypertension, with accelerated hypertension starting in approximately October, accompanied by shortness of breath. 2.  Chest pain although somewhat atypical, being sharp in character and not necessarily related to activity. 3.  Sleep apnea, on a CPAP mask for 4 years although has not been using it consistently in the last 3 months and not at all in the last 1 month because of shortness of breath. 4.  Normal O2 saturation of 95%, with walking in the hallway although was fairly short of breath. 5.  Mild hyperlipidemia. PLAN:  1.  A 24-hour urine for metanephrines and catecholamines as well as renin and aldosterone. 2.  Increase Cardura 2 mg b.i.d.  3.  We will do treadmill stress test with O2 saturations. 4.  We will do STARLA and CRP. 5.  We will see her back in January. DISCUSSION:  Ms. Marquez Boogie has had hypertension for several years, which has been controlled. However, in October, it began to be accelerated and has been in the 160/100 range. She has also developed some flushing episodes, although it is difficult to get a good history, time or exact symptoms that she is experiencing. Her blood pressure is still fairly elevated in spite of the addition of Cardura. I will rule out pheochromocytoma and we will also do a sed rate and CRP along with renin and aldosterone. We will increase her Cardura to 2 mg b.i.d. and we will see her in 1 week for a blood pressure check and a full evaluation in January. I suspect that we will need to increase her Cardura up to 8 mg b.i.d. If she is still elevated on a maximum dose of Cardura, would add ACE inhibitor, such as lisinopril or ramipril. If she is still uncontrolled with maximum dose of Toprol, Cardura and an ACE inhibitor, then would do an MRA of her renal arteries. It is somewhat unusual that she would start developing her accelerated hypertension only in the last 3 months. Of note, also however she has not been using her CPAP mask. I suspect that she will need to be fitted with a new mask. Thank you very much for allowing me the privilege of seeing Mrs. Jamie Carrillo. I will be there for her stress test and will be able to see her shortness of breath and measure her O2 saturation while she is on the treadmill.     Sincerely,      Franco Tinsley    D: 12/13/2020 20:17:15     T: 12/13/2020 20:24:12     [email or web address]  Job#: [id number]   Doc#: [id number]

## 2020-12-16 RX ORDER — METOPROLOL SUCCINATE 100 MG/1
TABLET, EXTENDED RELEASE ORAL
Qty: 30 TABLET | Refills: 2 | Status: SHIPPED | OUTPATIENT
Start: 2020-12-16 | End: 2021-03-31 | Stop reason: SDUPTHER

## 2020-12-16 NOTE — TELEPHONE ENCOUNTER
Last OV: 9/16/2020  Last RX: 09/16/2020   Next scheduled apt: 3/10/2021    Medication pending. Joanna Haas   Health Maintenance   Topic Date Due    DTaP/Tdap/Td vaccine (1 - Tdap) 05/09/1989    Cervical cancer screen  04/18/2019    Shingles Vaccine (1 of 2) 05/09/2020    Colon cancer screen colonoscopy  05/09/2020    Flu vaccine (1) 09/01/2020    Potassium monitoring  11/19/2021    Creatinine monitoring  11/19/2021    Breast cancer screen  05/07/2022    Lipid screen  11/19/2025    HIV screen  Addressed    Hepatitis A vaccine  Aged Out    Hepatitis B vaccine  Aged Out    Hib vaccine  Aged Out    Meningococcal (ACWY) vaccine  Aged Out    Pneumococcal 0-64 years Vaccine  Aged Out             (applicable per patient's age: Cancer Screenings, Depression Screening, Fall Risk Screening, Immunizations)    Hemoglobin A1C (%)   Date Value   01/25/2018 5.3     LDL Cholesterol (mg/dL)   Date Value   11/19/2020 116     AST (U/L)   Date Value   11/19/2020 29     ALT (U/L)   Date Value   11/19/2020 55 (H)     BUN (mg/dL)   Date Value   11/19/2020 13      (goal A1C is < 7)   (goal LDL is <100) need 30-50% reduction from baseline     BP Readings from Last 3 Encounters:   12/10/20 (!) 160/100   04/28/20 (!) 160/110   03/22/18 (!) 150/91    (goal /80)      All Future Testing planned in [facility]:  Lab Frequency Next Occurrence   CARDIAC STRESS TEST EXERCISE ONLY Once 12/17/2020       Next Visit Date:  Future Appointments   Date Time Provider German Gomes   1/18/2021  3:00 PM MD Liane Lin Gurvinder Zhijiang [employer]   3/10/2021  4:30 PM PAULIE Siu - CNP [facility]            Patient Active Problem List:     Obstructive sleep apnea on CPAP     Essential hypertension     Biliary dyskinesia

## 2020-12-17 LAB
CATECHOL/URINE INTERP: NORMAL
CREATININE URINE /24 HR: 1221 MG/D (ref 700–1600)
CREATININE URINE /24 HR: 1221 MG/D (ref 700–1600)
CREATININE URINE /VOLUME: 111 MG/DL
CREATININE URINE /VOLUME: 111 MG/DL
DOPAMINE 24 HOUR URINE: 190 UG/D (ref 71–485)
DOPAMINE, (UG/L): 173 UG/L
DOPAMINE, UR/G CRT: 156 UG/G CRT (ref 0–250)
EPINEPHRINE 24 HOUR URINE: 1 UG/D (ref 1–14)
EPINEPHRINE, (UG/L): 1 UG/L
EPINEPHRINE, UR/G CRT: 1 UG/G CRT (ref 0–20)
EPINEPHRINE, UR/G CRT: 22 UG/G CRT (ref 0–45)
HOURS COLLECTED: 24
HOURS COLLECTED: 24
METANEPHRINE UF INTERPRETATION: NORMAL
METANEPHRINE UG/G CRE: 65 UG/G CRT (ref 0–300)
METANEPHRINES 24 HOUR URINE: 79 UG/D (ref 36–229)
METANEPHRINES, URINE (UMOL/L): 72 UG/L
NOREPINEPHRINE 24 HOUR URINE: 26 UG/D (ref 14–120)
NOREPINEPHRINE, (UG/L): 24 UG/L
NORMETANEPHRINE, (G/CRT): 216 UG/G CRT (ref 0–400)
NORMETANEPHRINE, (NMOL/DAY): 264 UG/D (ref 95–650)
NORMETANEPHRINES, NMOL/L: 240 UG/L
URINE VOLUME: 1100
URINE VOLUME: 1100

## 2021-01-06 LAB
SEND OUT REPORT: NORMAL
TEST NAME: NORMAL

## 2021-01-20 ENCOUNTER — TELEPHONE (OUTPATIENT)
Dept: PRIMARY CARE CLINIC | Age: 51
End: 2021-01-20

## 2021-01-20 RX ORDER — PANTOPRAZOLE SODIUM 40 MG/1
40 TABLET, DELAYED RELEASE ORAL
Qty: 30 TABLET | Refills: 0 | Status: SHIPPED | OUTPATIENT
Start: 2021-01-20 | End: 2021-04-27 | Stop reason: SDUPTHER

## 2021-01-21 NOTE — TELEPHONE ENCOUNTER
Inform patient I sent in PPI protonix 40 mg daily in am trial x 1 month if it helps greatly want to stay on this dose short term for 3 full months then decrease dose     thanks

## 2021-02-09 ENCOUNTER — TELEPHONE (OUTPATIENT)
Dept: PRIMARY CARE CLINIC | Age: 51
End: 2021-02-09

## 2021-02-09 ENCOUNTER — HOSPITAL ENCOUNTER (OUTPATIENT)
Age: 51
Setting detail: SPECIMEN
Discharge: HOME | End: 2021-02-09
Payer: COMMERCIAL

## 2021-02-09 DIAGNOSIS — J06.9 VIRAL UPPER RESPIRATORY TRACT INFECTION: Primary | ICD-10-CM

## 2021-02-09 DIAGNOSIS — R51.9 ACUTE NONINTRACTABLE HEADACHE, UNSPECIFIED HEADACHE TYPE: ICD-10-CM

## 2021-02-09 LAB
SARS-COV-2, RAPID: NOT DETECTED
SARS-COV-2: NORMAL
SARS-COV-2: NORMAL
SOURCE: NORMAL

## 2021-02-09 PROCEDURE — U0002 COVID-19 LAB TEST NON-CDC: HCPCS

## 2021-02-09 PROCEDURE — C9803 HOPD COVID-19 SPEC COLLECT: HCPCS

## 2021-03-31 DIAGNOSIS — I10 ESSENTIAL HYPERTENSION: ICD-10-CM

## 2021-04-01 RX ORDER — METOPROLOL SUCCINATE 100 MG/1
100 TABLET, EXTENDED RELEASE ORAL DAILY
Qty: 30 TABLET | Refills: 2 | Status: SHIPPED | OUTPATIENT
Start: 2021-04-01 | End: 2021-08-13 | Stop reason: SDUPTHER

## 2021-04-27 DIAGNOSIS — K21.9 GASTROESOPHAGEAL REFLUX DISEASE WITHOUT ESOPHAGITIS: ICD-10-CM

## 2021-04-27 RX ORDER — PANTOPRAZOLE SODIUM 40 MG/1
40 TABLET, DELAYED RELEASE ORAL
Qty: 30 TABLET | Refills: 0 | Status: SHIPPED | OUTPATIENT
Start: 2021-04-27 | End: 2021-04-29 | Stop reason: ALTCHOICE

## 2021-04-27 RX ORDER — SUCRALFATE 1 G/1
1 TABLET ORAL
Qty: 60 TABLET | Refills: 1 | Status: SHIPPED | OUTPATIENT
Start: 2021-04-27 | End: 2021-04-29 | Stop reason: ALTCHOICE

## 2021-04-29 ENCOUNTER — OFFICE VISIT (OUTPATIENT)
Dept: PRIMARY CARE CLINIC | Age: 51
End: 2021-04-29
Payer: COMMERCIAL

## 2021-04-29 VITALS
HEIGHT: 67 IN | DIASTOLIC BLOOD PRESSURE: 88 MMHG | WEIGHT: 224 LBS | TEMPERATURE: 97.2 F | BODY MASS INDEX: 35.16 KG/M2 | SYSTOLIC BLOOD PRESSURE: 130 MMHG | HEART RATE: 78 BPM | OXYGEN SATURATION: 98 %

## 2021-04-29 DIAGNOSIS — M94.0 TIETZE'S DISEASE: ICD-10-CM

## 2021-04-29 DIAGNOSIS — E66.9 OBESITY (BMI 35.0-39.9 WITHOUT COMORBIDITY): ICD-10-CM

## 2021-04-29 DIAGNOSIS — Z99.89 OBSTRUCTIVE SLEEP APNEA ON CPAP: ICD-10-CM

## 2021-04-29 DIAGNOSIS — I10 ESSENTIAL HYPERTENSION: Primary | ICD-10-CM

## 2021-04-29 DIAGNOSIS — G47.33 OBSTRUCTIVE SLEEP APNEA ON CPAP: ICD-10-CM

## 2021-04-29 DIAGNOSIS — Z12.11 COLON CANCER SCREENING: ICD-10-CM

## 2021-04-29 DIAGNOSIS — K21.9 GASTROESOPHAGEAL REFLUX DISEASE WITHOUT ESOPHAGITIS: ICD-10-CM

## 2021-04-29 PROCEDURE — 1036F TOBACCO NON-USER: CPT | Performed by: NURSE PRACTITIONER

## 2021-04-29 PROCEDURE — 99214 OFFICE O/P EST MOD 30 MIN: CPT | Performed by: NURSE PRACTITIONER

## 2021-04-29 PROCEDURE — G8417 CALC BMI ABV UP PARAM F/U: HCPCS | Performed by: NURSE PRACTITIONER

## 2021-04-29 PROCEDURE — 3017F COLORECTAL CA SCREEN DOC REV: CPT | Performed by: NURSE PRACTITIONER

## 2021-04-29 PROCEDURE — G8427 DOCREV CUR MEDS BY ELIG CLIN: HCPCS | Performed by: NURSE PRACTITIONER

## 2021-04-29 RX ORDER — INDOMETHACIN 25 MG/1
25 CAPSULE ORAL 2 TIMES DAILY WITH MEALS
Qty: 30 CAPSULE | Refills: 0 | Status: SHIPPED | OUTPATIENT
Start: 2021-04-29 | End: 2021-08-24 | Stop reason: ALTCHOICE

## 2021-04-29 SDOH — ECONOMIC STABILITY: TRANSPORTATION INSECURITY
IN THE PAST 12 MONTHS, HAS LACK OF TRANSPORTATION KEPT YOU FROM MEETINGS, WORK, OR FROM GETTING THINGS NEEDED FOR DAILY LIVING?: NO

## 2021-04-29 SDOH — ECONOMIC STABILITY: FOOD INSECURITY: WITHIN THE PAST 12 MONTHS, THE FOOD YOU BOUGHT JUST DIDN'T LAST AND YOU DIDN'T HAVE MONEY TO GET MORE.: NEVER TRUE

## 2021-04-29 SDOH — ECONOMIC STABILITY: TRANSPORTATION INSECURITY
IN THE PAST 12 MONTHS, HAS THE LACK OF TRANSPORTATION KEPT YOU FROM MEDICAL APPOINTMENTS OR FROM GETTING MEDICATIONS?: NO

## 2021-04-29 ASSESSMENT — ENCOUNTER SYMPTOMS
RHINORRHEA: 0
EYES NEGATIVE: 1
COUGH: 0
ABDOMINAL PAIN: 0
CONSTIPATION: 0
BLOOD IN STOOL: 0

## 2021-04-29 ASSESSMENT — PATIENT HEALTH QUESTIONNAIRE - PHQ9
SUM OF ALL RESPONSES TO PHQ QUESTIONS 1-9: 0
2. FEELING DOWN, DEPRESSED OR HOPELESS: 0

## 2021-04-29 NOTE — PATIENT INSTRUCTIONS
Patient Education      need update Tdap (tetanus diptheria, and pertussis) ask at local pharmacy. Costochondritis: Care Instructions  Your Care Instructions  You have chest pain because the cartilage of your rib cage is inflamed. This problem is called costochondritis. This type of chest wall pain may last from days to weeks. It is not a heart problem. Sometimes costochondritis occurs with a cold or the flu, and other times the exact cause is not known. Follow-up care is a key part of your treatment and safety. Be sure to make and go to all appointments, and call your doctor if you are having problems. It's also a good idea to know your test results and keep a list of the medicines you take. How can you care for yourself at home? · Take medicines for pain and inflammation exactly as directed. ? If the doctor gave you a prescription medicine, take it as prescribed. ? If you are not taking a prescription pain medicine, ask your doctor if you can take an over-the-counter medicine. ? Do not take two or more pain medicines at the same time unless the doctor told you to. Many pain medicines have acetaminophen, which is Tylenol. Too much acetaminophen (Tylenol) can be harmful. · It may help to use a warm compress or heating pad (set on low) on your chest. You can also try alternating heat and ice. Put ice or a cold pack on the area for 10 to 20 minutes at a time. Put a thin cloth between the ice and your skin. · Avoid any activity that strains the chest area. As your pain gets better, you can slowly return to your normal activities. · Do not use tape, an elastic bandage, a \"rib belt,\" or anything else that restricts your chest wall motion. When should you call for help? Call 911 anytime you think you may need emergency care. For example, call if:    · You have new or different chest pain or pressure. This may occur with:  ? Sweating. ? Shortness of breath. ? Nausea or vomiting.   ? Pain that spreads from a computer screen) as he or she moves the scope. A doctor may do this procedure to look for ulcers, tumors, infection, or bleeding. It also can be used to look for signs of acid backing up into your esophagus. This is called gastroesophageal reflux disease, or GERD. The doctor can use the scope to take a sample of tissue for study (a biopsy). The doctor also can use the scope to take out growths or stop bleeding. Follow-up care is a key part of your treatment and safety. Be sure to make and go to all appointments, and call your doctor if you are having problems. It's also a good idea to know your test results and keep a list of the medicines you take. How do you prepare for the procedure? Procedures can be stressful. This information will help you understand what you can expect. And it will help you safely prepare for your procedure. Preparing for the procedure    · Do not eat or drink anything for 6 to 8 hours before the test. An empty stomach helps your doctor see your stomach clearly during the test. It also reduces your chances of vomiting. If you vomit, there is a small risk that the vomit could enter your lungs. (This is called aspiration.) If the test is done in an emergency, a tube may be inserted through your nose or mouth to empty your stomach.     · Do not take sucralfate (Carafate) or antacids on the day of the test. These medicines can make it hard for your doctor to see your upper GI tract.     · If your doctor tells you to, stop taking iron supplements 7 to 14 days before the test.     · Be sure you have someone to take you home. Anesthesia and pain medicine will make it unsafe for you to drive or get home on your own.     · Understand exactly what procedure is planned, along with the risks, benefits, and other options. · Tell your doctor ALL the medicines, vitamins, supplements, and herbal remedies you take. Some may increase the risk of problems during your procedure.  Your doctor will tell you if you should stop taking any of them before the procedure and how soon to do it.     · If you take aspirin or some other blood thinner, ask your doctor if you should stop taking it before your procedure. Make sure that you understand exactly what your doctor wants you to do. These medicines increase the risk of bleeding.     · Make sure your doctor and the hospital have a copy of your advance directive. If you don't have one, you may want to prepare one. It lets others know your health care wishes. It's a good thing to have before any type of surgery or procedure. What happens on the day of the procedure? · Follow the instructions exactly about when to stop eating and drinking. If you don't, your procedure may be canceled. If your doctor told you to take your medicines on the day of the procedure, take them with only a sip of water.     · Take a bath or shower before you come in for your procedure. Do not apply lotions, perfumes, deodorants, or nail polish.     · Take off all jewelry and piercings. And take out contact lenses, if you wear them. At the hospital or surgery center   · Bring a picture ID.     · The test may take 15 to 30 minutes.     · The doctor may spray medicine on the back of your throat to numb it. You also will get medicine to prevent pain and to relax you.     · You will lie on your left side. The doctor will put the scope in your mouth and toward the back of your throat. The doctor will tell you when to swallow. This helps the scope move down your throat. You will be able to breathe normally. The doctor will move the scope down your esophagus into your stomach. The doctor also may look at the duodenum.     · If your doctor wants to take a sample of tissue for a biopsy, he or she may use small surgical tools, which are put into the scope, to cut off some tissue. You will not feel a biopsy, if one is taken.  The doctor also can use the tools to stop bleeding or to do other treatments, if needed.     · You will stay at the hospital or surgery center for 1 to 2 hours until the medicine you were given wears off. What happens after an upper GI endoscopy? · After the test, you may belch and feel bloated for a while.     · You may have a tickling, dry throat or mouth. You may feel a bit hoarse, and you may have a mild sore throat. These symptoms may last several days. Throat lozenges and warm saltwater gargles can help relieve the throat symptoms.     · Don't drive or operate machinery for 12 hours after the test.     · Your doctor will tell you when you can go back to your usual diet and activities.     · Don't drink alcohol for 12 to 24 hours after the test.   When should you call your doctor? · You have questions or concerns.     · You don't understand how to prepare for your procedure.     · You become ill before the procedure (such as fever, flu, or a cold).     · You need to reschedule or have changed your mind about having the procedure. Where can you learn more? Go to [email or web address] org and sign in to your [employer] account. Enter P790 in the [employer] box to learn more about \"Upper GI Endoscopy: Before Your Procedure. \"     If you do not have an account, please click on the \"Sign Up Now\" link. Current as of: April 15, 2020               Content Version: 12.8  © 2006-2021 Healthwise, [employer]. Care instructions adapted under license by [facility] ([facility]). If you have questions about a medical condition or this instruction, always ask your healthcare professional. [address] [address] any warranty or liability for your use of this information. Patient Education        Learning About Colonoscopy  What is a colonoscopy? A colonoscopy is a test (also called a procedure) that lets a doctor look inside your large intestine. The doctor uses a thin, lighted tube called a colonoscope.  The doctor uses it to look for small growths called polyps, colon or rectal cancer (colorectal cancer), or other problems like bleeding. During the procedure, the doctor can take samples of tissue. The samples can then be checked for cancer or other conditions. The doctor can also take out polyps. How is a colonoscopy done? This procedure is done in a doctor's office or a clinic or hospital. You will get medicine to help you relax and not feel pain. Some people find that they don't remember having the test because of the medicine. The doctor gently moves the colonoscope, or scope, through the colon. The scope is also a small video camera. It lets the doctor see the colon and take pictures. How do you prepare for the procedure? You need to clean out your colon before the procedure so the doctor can see all of your colon. This process may start a day or two before the test. This depends on which \"colon prep\" your doctor recommends. To clean your colon, you stop eating solid foods and drink only clear liquids. You can have water, tea, coffee, clear juices, clear broths, flavored ice pops, and gelatin (such as Jell-O). Do not drink anything red or purple. The day or night before the procedure, you drink a large amount of a special liquid. This causes loose, frequent stools. You will go to the bathroom a lot. It's very important to drink all of the liquid. If you have problems drinking it, call your doctor. Some people don't go to work or do their usual activities on the day of the prep. Arrange to have someone take you home after the test.  What can you expect after a colonoscopy? Your doctor will tell you when you can eat and do your usual activities. Drink a lot of fluid after the test to replace the fluids you may have lost during the colon prep. But don't drink alcohol. Your doctor will talk to you about when you'll need your next colonoscopy.  The results of your test and your risk for colorectal cancer will help your doctor decide how often you need to

## 2021-04-29 NOTE — PROGRESS NOTES
[facility] [address] PRIMARY CARE [address] [address]  Dept: [phone]  Dept Fax: [phone]    Last encounter 9/16/2020    Hypertension and Abdominal Pain (Pt has been dealing with some abdominal pain for about a month now, also havnig alot of heart burn. )       HPI:   Kirstin Guido is a 48 y.o. female who presentstoday for her medical conditions/complaints as noted below. Kirstin Guido is c/o of Hypertension and Abdominal Pain (Pt has been dealing with some abdominal pain for about a month now, also havnig alot of heart burn. )      HPI  Established patient    Headache rare on toprol XL for bp control and cardura , feels some stress with business of job and life. 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 Pt with cleaning and moping has aggravated upper chest bilateral in cartilage area near sternum bilaterally, discussed costochondritis and reproducible pain with palpation, no ecchymosis no rash  No injury   Costochondritis on both sides rib 1-3 on R and 1-2 on left   Will treat with indomethacin. Also some tenderness left 10 th rib no injury. GERD-needing stomach medication even before gallbladder surgery and never has been able to stop without significant GERD. Noah Sena Has used PPI chronically for heartburn which she feels is bad lately with running out of PPI, pt has never been tested for gastric, ulcer, has had chronic reflux. Denies smoking, no ASA, likes spicy foods but eats very little of them anymore. Has used some carafate in past with good response. Pt also due for baseline colonoscopy no family hx colon cancer, no laxative, no change in bowel habits. Discussed both Colonoscopy and possible EGD with local surgeon. Tdap vaccine encouraged. Pt has not had covid.          Reviewed prior notes None  Reviewed previous Labs, Imaging and Hospital Records    Past Medical History:   Diagnosis Date    Anxiety     GERD (gastroesophageal reflux disease)     Headache(784.0)     Hypertension     Sleep apnea       Past Surgical History:   Procedure Laterality Date    CHOLECYSTECTOMY  06-    laproscopic    HYSTERECTOMY         Family History   Problem Relation Age of Onset    Cancer Father         Lung Cancer    Cancer Paternal Grandmother         Ovarian Cancer       Social History     Tobacco Use    Smoking status: Never Smoker    Smokeless tobacco: Never Used   Substance Use Topics    Alcohol use: No      Current Outpatient Medications   Medication Sig Dispense Refill    indomethacin (INDOCIN) 25 MG capsule Take 1 capsule by mouth 2 times daily (with meals) For costochondritis 30 capsule 0    metoprolol succinate (TOPROL XL) 100 MG extended release tablet Take 1 tablet by mouth daily 30 tablet 2    doxazosin (CARDURA) 2 MG tablet Take 1 tablet by mouth 2 times daily 60 tablet 11     No current facility-administered medications for this visit. Allergies   Allergen Reactions    Codeine Nausea And Vomiting    Lisinopril Hives     Hives/shortness of breath    Morphine      Vomiting and nausea        Health Maintenance   Topic Date Due    Hepatitis C screen  Never done    COVID-19 Vaccine (1) Never done    DTaP/Tdap/Td vaccine (1 - Tdap) Never done    Cervical cancer screen  04/18/2019    Shingles Vaccine (1 of 2) Never done    Colon cancer screen colonoscopy  Never done    Flu vaccine (Season Ended) 09/01/2021    Potassium monitoring  11/19/2021    Creatinine monitoring  11/19/2021    Breast cancer screen  05/07/2022    Lipid screen  11/19/2025    HIV screen  Addressed    Hepatitis A vaccine  Aged Out    Hepatitis B vaccine  Aged Out    Hib vaccine  Aged Out    Meningococcal (ACWY) vaccine  Aged Out    Pneumococcal 0-64 years Vaccine  Aged Out       Subjective:      Review of Systems   Constitutional: Negative for activity change, appetite change and fatigue. HENT: Negative for congestion and rhinorrhea. Eyes: Negative. Respiratory: Negative for cough. Cardiovascular: Negative for chest pain. Gastrointestinal: Negative for abdominal pain, blood in stool and constipation. Endocrine: Negative for cold intolerance and heat intolerance. Genitourinary: Negative for difficulty urinating. Musculoskeletal: Positive for joint swelling (upper chest 1-2 rib at sternal border). Negative for arthralgias and gait problem. Skin: Negative for rash. Hematological: Negative for adenopathy. Psychiatric/Behavioral: Negative for agitation. Objective:     Physical Exam  Vitals signs and nursing note reviewed. Constitutional:       General: She is not in acute distress. Appearance: Normal appearance. She is well-developed. HENT:      Head: Normocephalic and atraumatic. Right Ear: Tympanic membrane and external ear normal.      Left Ear: Tympanic membrane and external ear normal.      Nose: No congestion. Mouth/Throat:      Mouth: Mucous membranes are moist.   Eyes:      General: No scleral icterus. Pupils: Pupils are equal, round, and reactive to light. Neck:      Musculoskeletal: Normal range of motion and neck supple. Muscular tenderness (anterior chest 1-2 rib right and 2nd rib left at sternal border reproducible tenerness with palpation) present. Cardiovascular:      Rate and Rhythm: Normal rate and regular rhythm. Heart sounds: Normal heart sounds. No murmur. Pulmonary:      Effort: Pulmonary effort is normal. No respiratory distress. Breath sounds: Normal breath sounds. No wheezing. Abdominal:      General: Abdomen is flat. Bowel sounds are normal.      Palpations: Abdomen is soft. There is no mass. Tenderness: There is no abdominal tenderness. Comments: No hepatospleenomegaly   Musculoskeletal: Normal range of motion. General: Tenderness (Left SLR positive with sciatica on right ) present.       Right lower leg: No edema. Left lower leg: No edema. Lymphadenopathy:      Cervical: No cervical adenopathy. Skin:     General: Skin is warm and dry. Findings: No rash. Neurological:      Mental Status: She is alert and oriented to person, place, and time. Psychiatric:         Behavior: Behavior normal.         Thought Content: Thought content normal.         Judgment: Judgment normal.       /88 (Site: Right Upper Arm, Position: Sitting, Cuff Size: Large Adult)   Pulse 78   Temp 97.2 °F (36.2 °C) (Infrared)   Ht 5' 6.5\" (1.689 m)   Wt 224 lb (101.6 kg)   SpO2 98%   BMI 35.61 kg/m²     Data:     Lab Results   Component Value Date     11/19/2020    K 4.2 11/19/2020     11/19/2020    CO2 24 11/19/2020    BUN 13 11/19/2020    CREATININE 0.72 11/19/2020    GLUCOSE 100 11/19/2020    PROT 7.8 11/19/2020    LABALBU 5.0 11/19/2020    BILITOT 0.54 11/19/2020    ALKPHOS 51 11/19/2020    AST 29 11/19/2020    ALT 55 11/19/2020     Lab Results   Component Value Date    WBC 5.6 11/19/2020    RBC 4.51 11/19/2020    HGB 13.6 11/19/2020    HCT 39.4 11/19/2020    MCV 87.3 11/19/2020    MCH 30.0 11/19/2020    MCHC 34.4 11/19/2020    RDW 13.4 11/19/2020     11/19/2020    MPV NOT REPORTED 11/19/2020     Lab Results   Component Value Date    TSH 2.49 11/19/2020     Lab Results   Component Value Date    CHOL 171 11/19/2020    HDL 34 11/19/2020    LABA1C 5.3 01/25/2018          Assessment & Plan       1. Essential hypertension  Stable and controlled    2. Gastroesophageal reflux disease without esophagitis  Worsening  Never checked for H. PYlori years hx PPI use, will add carafate and restart PPI  [place] diet  Good hydration  - Mona Martinez MD, General Surgery, [facility]    3. Tietze's disease    - indomethacin (INDOCIN) 25 MG capsule; Take 1 capsule by mouth 2 times daily (with meals) For costochondritis  Dispense: 30 capsule; Refill: 0    4.  Obesity (BMI 35.0-39.9 without comorbidity) 5. Obstructive sleep apnea on CPAP  Compliant with use    6. Colon cancer screening  Needs baseline  - Mona Bruce MD, General Surgery, [facility]                  Completed Refills   Requested Prescriptions     Signed Prescriptions Disp Refills    indomethacin (INDOCIN) 25 MG capsule 30 capsule 0     Sig: Take 1 capsule by mouth 2 times daily (with meals) For costochondritis     No follow-ups on file. Orders Placed This Encounter   Medications    indomethacin (INDOCIN) 25 MG capsule     Sig: Take 1 capsule by mouth 2 times daily (with meals) For costochondritis     Dispense:  30 capsule     Refill:  0     Orders Placed This Encounter   Procedures   Inocencia Milligan MD, General Surgery, [facility]     Referral Priority:   Routine     Referral Type:   Eval and Treat     Referral Reason:   Specialty Services Required     Referred to Provider:   Iesha Bennett MD     Requested Specialty:   General Surgery     Number of Visits Requested:   1         Christina Kendrickleonarda received counseling on the following healthy behaviors: nutrition, exercise and medication adherence  Reviewed prior labs and health maintenance. Continue current medications, diet and exercise. Discussed use, benefit, and side effects of prescribed medications. Barriers to medication compliance addressed. Patient given educational materials - see patient instructions. All patient questions answered. Patient voiced understanding. On this date 4/29/2021 I have spent 37 minutes reviewing previous notes, test results and face to face with the patient discussing the diagnosis and importance of compliance with the treatment plan as well as documenting on the day of the visit.      Electronically signed by PAULIE Oliveira CNP on 4/29/2021 at 8:12 PM

## 2021-08-13 DIAGNOSIS — I10 ESSENTIAL HYPERTENSION: ICD-10-CM

## 2021-08-13 RX ORDER — METOPROLOL SUCCINATE 100 MG/1
100 TABLET, EXTENDED RELEASE ORAL DAILY
Qty: 30 TABLET | Refills: 2 | Status: SHIPPED | OUTPATIENT
Start: 2021-08-13 | End: 2021-10-04 | Stop reason: SDUPTHER

## 2021-08-13 NOTE — TELEPHONE ENCOUNTER
----- Message from [facility] sent at 8/13/2021 12:40 PM EDT -----  Subject: Refill Request    QUESTIONS  Name of Medication? metoprolol succinate (TOPROL XL) 100 MG extended   release tablet  Patient-reported dosage and instructions? once daily mornings, 100 mg  How many days do you have left? 0  Preferred Pharmacy? [address] phone number (if available)? [phone]  ---------------------------------------------------------------------------  --------------  CALL BACK INFO  What is the best way for the office to contact you? OK to leave message on   voicemail, OK to respond with electronic message via [employer] portal (only   for patients who have registered [employer] account)  Preferred Call Back Phone Number?  [phone]

## 2021-08-13 NOTE — TELEPHONE ENCOUNTER
Last OV: 4/29/2021  Last RX: 04/1/21   Next scheduled apt: Visit date not found    Medication pending. Health Maintenance   Topic Date Due    Hepatitis C screen  Never done    COVID-19 Vaccine (1) Never done    DTaP/Tdap/Td vaccine (1 - Tdap) Never done    Colon cancer screen colonoscopy  Never done    Cervical cancer screen  04/18/2019    Shingles Vaccine (1 of 2) Never done    Flu vaccine (1) 09/01/2021    Potassium monitoring  11/19/2021    Creatinine monitoring  11/19/2021    Breast cancer screen  05/07/2022    Lipid screen  11/19/2025    HIV screen  Addressed    Hepatitis A vaccine  Aged Out    Hepatitis B vaccine  Aged Out    Hib vaccine  Aged Out    Meningococcal (ACWY) vaccine  Aged Out    Pneumococcal 0-64 years Vaccine  Aged Out             (applicable per patient's age: Cancer Screenings, Depression Screening, Fall Risk Screening, Immunizations)    Hemoglobin A1C (%)   Date Value   01/25/2018 5.3     LDL Cholesterol (mg/dL)   Date Value   11/19/2020 116     AST (U/L)   Date Value   11/19/2020 29     ALT (U/L)   Date Value   11/19/2020 55 (H)     BUN (mg/dL)   Date Value   11/19/2020 13      (goal A1C is < 7)   (goal LDL is <100) need 30-50% reduction from baseline     BP Readings from Last 3 Encounters:   04/29/21 130/88   12/10/20 (!) 160/100   04/28/20 (!) 160/110    (goal /80)      All Future Testing planned in [facility]:  Lab Frequency Next Occurrence   CARDIAC STRESS TEST EXERCISE ONLY Once 12/17/2020   COVID-19 Once 02/09/2021       Next Visit Date:  No future appointments.          Patient Active Problem List:     Obstructive sleep apnea on CPAP     Essential hypertension     Obesity (BMI 35.0-39.9 without comorbidity)     Gastroesophageal reflux disease without esophagitis

## 2021-08-24 ENCOUNTER — VIRTUAL VISIT (OUTPATIENT)
Dept: PRIMARY CARE CLINIC | Age: 51
End: 2021-08-24
Payer: COMMERCIAL

## 2021-08-24 ENCOUNTER — HOSPITAL ENCOUNTER (OUTPATIENT)
Dept: PREADMISSION TESTING | Age: 51
Setting detail: SPECIMEN
Discharge: HOME | End: 2021-08-24
Payer: COMMERCIAL

## 2021-08-24 DIAGNOSIS — R11.2 INTRACTABLE VOMITING WITH NAUSEA, UNSPECIFIED VOMITING TYPE: ICD-10-CM

## 2021-08-24 DIAGNOSIS — J06.9 UPPER RESPIRATORY TRACT INFECTION, UNSPECIFIED TYPE: Primary | ICD-10-CM

## 2021-08-24 DIAGNOSIS — J06.9 UPPER RESPIRATORY TRACT INFECTION, UNSPECIFIED TYPE: ICD-10-CM

## 2021-08-24 LAB
SARS-COV-2, RAPID: DETECTED
SPECIMEN DESCRIPTION: ABNORMAL

## 2021-08-24 PROCEDURE — C9803 HOPD COVID-19 SPEC COLLECT: HCPCS

## 2021-08-24 PROCEDURE — 87635 SARS-COV-2 COVID-19 AMP PRB: CPT

## 2021-08-24 PROCEDURE — 99442 PR PHYS/QHP TELEPHONE EVALUATION 11-20 MIN: CPT | Performed by: NURSE PRACTITIONER

## 2021-08-24 RX ORDER — SUCRALFATE 1 G/1
1 TABLET ORAL 4 TIMES DAILY
COMMUNITY
End: 2022-02-22 | Stop reason: SDUPTHER

## 2021-08-24 RX ORDER — ONDANSETRON 4 MG/1
4 TABLET, ORALLY DISINTEGRATING ORAL EVERY 8 HOURS PRN
Qty: 8 TABLET | Refills: 0 | Status: SHIPPED | OUTPATIENT
Start: 2021-08-24 | End: 2021-10-04 | Stop reason: ALTCHOICE

## 2021-08-24 NOTE — PROGRESS NOTES
Darrian Gibbs is a 46 y.o. female evaluated via telephone on 8/24/2021. Friday started with illness:   Vomiting, \"thought I had food poisoning\" could not raise head to get out of bed. No energy, sat in [employer]cl[employer],     Freezing, sweating , burning up. Coughing a lot, no taste/smell. Did not get covid vaccine. No known sick contacts. Covid-19 symptom checklist    Fever   no  Cough  yes, productive   Loss of smell/taste   yes, Sunday   Severe headache   Yes   Weakness   yes,   Diarrhea   yes,   Muscle pain   yes,   Shortness of breath  no  Abdominal pain  no  Rash  no  Sore throat   no  Vomiting  yes, since Friday with poor intake. No food x 2 days. Red eye  no  Joint pain  no  Bruising/bleeding  no    Any travel in the last month? no    Have you been in contact with anyone expected/suspected to have or exposed to Covid-19   No  Attended benefit show last week- [facility] at [facility]. Have you practiced good social distancing, avoiding person-to-person interactions except by telecommunication, and maintained the homebound/stay at home advisement with the current Covid-19 pandemic in [place]?    yes -     Consent:  She and/or health care decision maker is aware that that she may receive a bill for this telephone service, depending on her insurance coverage, and has provided verbal consent to proceed: Yes      Documentation:  I communicated with the patient and/or health care decision maker about illness and risk covid   pt currently on zinc  Ibuprofen for fever.      Details of this discussion including any medical advice provided:       Medications encouraged during covid -19 viral infection are:     Tylenol or ibuprofen for pain/fever/body aches  Good hydration  Vitamin C 500 or 1000 mg daily (for immunity boost)  Vitamin D3 2000 international units daily (for muscles including heart)  Zinc 50 mg daily  (mineral to decrease virus uptake in bowels)      Pt aware of monoclonal antibody therapy IV at [facility] will discuss consent and I have encouraged pt to receive this. Will discuss further when results of test are back    zofran sent to pharmacy to help with nausea and vomiting and to improve hydration. I affirm this is a Patient Initiated Episode with a Patient who has not had a related appointment within my department in the past 7 days or scheduled within the next 24 hours. Patient identification was verified at the start of the visit: Yes    Total Time: minutes: 11-20 minutes    The visit was conducted pursuant to the emergency declaration under the [address] [place] [address], [address] [place] [address] [facility] authority and the [employer] Act. Patient identification was verified, and a caregiver was present when appropriate. The patient was located in a state where the provider was credentialed to provide care.     Note: not billable if this call serves to triage the patient into an appointment for the relevant concern      PAULIE Schmidt - CNP

## 2021-09-07 ENCOUNTER — TELEPHONE (OUTPATIENT)
Dept: PRIMARY CARE CLINIC | Age: 51
End: 2021-09-07

## 2021-09-07 NOTE — TELEPHONE ENCOUNTER
Pt called in with Fax number to send work note. [phone]      States that she is still having a cough, some night sweats and Rt side pain under the ribs. Per PCP wants pt to come in and be seen. Scheduled for tomorrow for check on cough.

## 2021-09-08 ENCOUNTER — OFFICE VISIT (OUTPATIENT)
Dept: PRIMARY CARE CLINIC | Age: 51
End: 2021-09-08
Payer: COMMERCIAL

## 2021-09-08 VITALS
WEIGHT: 224 LBS | HEIGHT: 67 IN | BODY MASS INDEX: 35.16 KG/M2 | TEMPERATURE: 96.4 F | HEART RATE: 88 BPM | OXYGEN SATURATION: 96 %

## 2021-09-08 DIAGNOSIS — J01.00 ACUTE NON-RECURRENT MAXILLARY SINUSITIS: ICD-10-CM

## 2021-09-08 DIAGNOSIS — K11.21 ACUTE PAROTITIS: ICD-10-CM

## 2021-09-08 DIAGNOSIS — R16.2 HEPATOSPLENOMEGALY: ICD-10-CM

## 2021-09-08 DIAGNOSIS — U07.1 COVID-19: Primary | ICD-10-CM

## 2021-09-08 PROCEDURE — 1036F TOBACCO NON-USER: CPT | Performed by: NURSE PRACTITIONER

## 2021-09-08 PROCEDURE — G8417 CALC BMI ABV UP PARAM F/U: HCPCS | Performed by: NURSE PRACTITIONER

## 2021-09-08 PROCEDURE — G8427 DOCREV CUR MEDS BY ELIG CLIN: HCPCS | Performed by: NURSE PRACTITIONER

## 2021-09-08 PROCEDURE — 3017F COLORECTAL CA SCREEN DOC REV: CPT | Performed by: NURSE PRACTITIONER

## 2021-09-08 PROCEDURE — 99214 OFFICE O/P EST MOD 30 MIN: CPT | Performed by: NURSE PRACTITIONER

## 2021-09-08 RX ORDER — CEFDINIR 300 MG/1
300 CAPSULE ORAL 2 TIMES DAILY
Qty: 14 CAPSULE | Refills: 0 | Status: SHIPPED | OUTPATIENT
Start: 2021-09-08 | End: 2021-09-15

## 2021-09-08 NOTE — LETTER
[place] [address]  Phone: [phone]  Fax: [address], APRN - CNP        September 8, 2021     Patient: Jenifer Stallworth   YOB: 1970   Date of Visit: 9/8/2021       To Whom It May Concern: It is my medical opinion that Jenifer Stallworth  Has been off work 8/23/21 to today 9/8/21 due to covid 19 illness. She was seen today in office and continues to fight and recover from the covid-19 infection, she will remain off work 9/8/21 -9/15/21 for post covid bronchitis/sinusitis/parotitis. We do plan return to work without restriction on Thursday 9/16/21. If you have any questions or concerns, please don't hesitate to call.     Sincerely,        Manasa Dias, PAULIE - CNP

## 2021-09-08 NOTE — PROGRESS NOTES
HPI Notes    Name: Matthew Rank  : 1970         Chief Complaint:     Chief Complaint   Patient presents with    Cough     Complains of persistent productive cough cough after testing positive for COVID. Has positive result 21.  Abdominal Pain     Compalins of pain in right side of ABD that radiates to back. Area felt \"swollen\"        History of Present Illness:        HPI    Patient with c/o onset Friday with vomiting and felt weak and diaphoretic, found to be covid positive on 21. Headache, eye pressure,   Vomiting took medication to help keep nausea and vomiting controlled. Pt here today who is post covid. 19 days since covid   8 pound weight loss. Taste still altered , everything tastes like lead. No fevers, still walking up soaked in sweat. Does not have thermometer -given one today. Pt still feels wore out, having night sweats, wakes up drenched. bp stable  Headache on and off, still hard to eat. Denies overt shortness of breath, resp rate normal today. Congestion cough present nasal fullness drainage a lot yet thick. Gagging on it at times. Past Medical History:     Past Medical History:   Diagnosis Date    Anxiety     GERD (gastroesophageal reflux disease)     Headache(784.0)     Hypertension     Sleep apnea       Reviewed all health maintenance requirements and ordered appropriate tests  Health Maintenance Due   Topic Date Due    Hepatitis C screen  Never done    COVID-19 Vaccine (1) Never done    DTaP/Tdap/Td vaccine (1 - Tdap) Never done    Colon cancer screen colonoscopy  Never done    Shingles Vaccine (1 of 2) Never done    Flu vaccine (1) Never done       Past Surgical History:     Past Surgical History:   Procedure Laterality Date    CHOLECYSTECTOMY  2016    laproscopic    HYSTERECTOMY          Medications:       Prior to Admission medications    Medication Sig Start Date End Date Taking?  Authorizing Provider   cefdinir (OMNICEF) 300 MG capsule Take 1 capsule by mouth 2 times daily for 7 days Post covid bronchitis, parotitis 9/8/21 9/15/21 Yes Pheobe Coats, APRN - CNP   sucralfate (CARAFATE) 1 GM tablet Take 1 g by mouth 4 times daily    Historical Provider, MD   ondansetron (ZOFRAN-ODT) 4 MG disintegrating tablet Take 1 tablet by mouth every 8 hours as needed for Nausea or Vomiting 8/24/21   Pheobe Coats, APRN - CNP   metoprolol succinate (TOPROL XL) 100 MG extended release tablet Take 1 tablet by mouth daily 8/13/21   Pheobe Coats, APRN - CNP   doxazosin (CARDURA) 2 MG tablet Take 1 tablet by mouth 2 times daily 12/10/20   Indio De La Cruz MD        Allergies:       Codeine, Lisinopril, and Morphine    Social History:     Tobacco:    reports that she has never smoked. She has never used smokeless tobacco.  Alcohol:      reports no history of alcohol use. Drug Use:  reports no history of drug use. Family History:     Family History   Problem Relation Age of Onset    Cancer Father         Lung Cancer    Cancer Paternal Grandmother         Ovarian Cancer       Review of Systems:         Review of Systems   Constitutional: Negative for activity change, chills and fever. HENT: Positive for congestion, ear pain, postnasal drip, rhinorrhea, sinus pressure, sinus pain and sore throat. Negative for mouth sores and trouble swallowing. Respiratory: Positive for cough. Negative for shortness of breath and wheezing. Cardiovascular: Negative for chest pain. Gastrointestinal: Negative for abdominal distention and blood in stool. Endocrine: Negative for cold intolerance and heat intolerance. Genitourinary: Negative for difficulty urinating. Musculoskeletal: Negative for arthralgias. Skin: Negative for rash. Neurological: Positive for headaches. Negative for dizziness. Psychiatric/Behavioral: Positive for sleep disturbance. Negative for self-injury and suicidal ideas. The patient is nervous/anxious.             Physical Exam: Vitals:  Pulse 88   Temp 96.4 °F (35.8 °C) (Temporal)   Ht 5' 6.5\" (1.689 m)   Wt 224 lb (101.6 kg)   SpO2 96%   BMI 35.61 kg/m²       Physical Exam  Vitals and nursing note reviewed. Constitutional:       General: She is not in acute distress. Appearance: She is well-developed. HENT:      Head: Normocephalic and atraumatic. Comments: Parotid tenderness with salivary duct enlargement on right side neck. No pus at china duct. No trismus. Right Ear: Tympanic membrane and external ear normal.      Left Ear: Tympanic membrane and external ear normal.      Nose: Congestion present. Mouth/Throat:      Mouth: Mucous membranes are moist.      Pharynx: Posterior oropharyngeal erythema present. Eyes:      General: No scleral icterus. Pupils: Pupils are equal, round, and reactive to light. Cardiovascular:      Rate and Rhythm: Normal rate and regular rhythm. Heart sounds: Normal heart sounds. No murmur heard. Pulmonary:      Effort: Pulmonary effort is normal. No respiratory distress. Breath sounds: Normal breath sounds. No wheezing. Abdominal:      General: Bowel sounds are normal. There is no distension. Palpations: Abdomen is soft. Tenderness: There is no abdominal tenderness. Hernia: No hernia is present. Comments: hepatospleenomegaly present on exam    Musculoskeletal:         General: Normal range of motion. Cervical back: Normal range of motion and neck supple. Right lower leg: No edema. Left lower leg: No edema. Lymphadenopathy:      Cervical: No cervical adenopathy. Skin:     General: Skin is warm and dry. Findings: No rash. Neurological:      Mental Status: She is alert and oriented to person, place, and time. Psychiatric:         Behavior: Behavior normal.         Thought Content:  Thought content normal.         Judgment: Judgment normal.                 Data:     Lab Results   Component Value Date     11/19/2020    K 4.2 11/19/2020     11/19/2020    CO2 24 11/19/2020    BUN 13 11/19/2020    CREATININE 0.72 11/19/2020    GLUCOSE 100 11/19/2020    PROT 7.8 11/19/2020    LABALBU 5.0 11/19/2020    BILITOT 0.54 11/19/2020    ALKPHOS 51 11/19/2020    AST 29 11/19/2020    ALT 55 11/19/2020     Lab Results   Component Value Date    WBC 5.6 11/19/2020    RBC 4.51 11/19/2020    HGB 13.6 11/19/2020    HCT 39.4 11/19/2020    MCV 87.3 11/19/2020    MCH 30.0 11/19/2020    MCHC 34.4 11/19/2020    RDW 13.4 11/19/2020     11/19/2020    MPV NOT REPORTED 11/19/2020     Lab Results   Component Value Date    TSH 2.49 11/19/2020     Lab Results   Component Value Date    CHOL 171 11/19/2020    HDL 34 11/19/2020    LABA1C 5.3 01/25/2018          Assessment & Plan        Diagnosis Orders   1. COVID-19     2. Acute non-recurrent maxillary sinusitis  cefdinir (OMNICEF) 300 MG capsule   3. Acute parotitis  cefdinir (OMNICEF) 300 MG capsule   4. Hepatosplenomegaly         Pt  off work 8/23/21 to today 9/8/21 due to covid 19 illness. She was seen today in office and continues to fight and recover from the covid-19 infection, she will remain off work 9/8/21 -9/15/21 for post covid bronchitis/sinusitis/parotitis. We do plan return to work without restriction on Thursday 9/16/21. losenges or lemonade to salivate more try to have something hourly for the next couple days. Good hydration  Start antibiotic  Watch for improvement. Completed Refills   Requested Prescriptions     Signed Prescriptions Disp Refills    cefdinir (OMNICEF) 300 MG capsule 14 capsule 0     Sig: Take 1 capsule by mouth 2 times daily for 7 days Post covid bronchitis, parotitis     Return in about 4 weeks (around 10/6/2021) for follow up post covid illness.  .  Orders Placed This Encounter   Medications    cefdinir (OMNICEF) 300 MG capsule     Sig: Take 1 capsule by mouth 2 times daily for 7 days Post covid bronchitis, parotitis     Dispense:  14 capsule     Refill:  0     No orders of the defined types were placed in this encounter. Patient Instructions       SURVEY:    You may be receiving a survey from [employer] regarding your visit today. Please complete the survey to enable us to provide the highest quality of care to you and your family. If you cannot score us a very good on any question, please call the office to discuss how we could have made your experience a very good one. Thank you. Received monoclonal antibody infusion on 8/26/21 , please plan on covid 19 vaccine on after NOv 25th (Thanksgiving) must be 90 days after above infusion. Patient Education        Parotitis: Care Instructions  Your Care Instructions     Parotitis is a painful swelling of your parotid glands, which are salivary glands located between the ear and jaw. The most common cause is a virus, such as mumps, herpes, or Tyson-Barr. Bacterial infections, diabetes, tumors or stones in the saliva glands, and tooth problems also may cause parotitis. Follow-up care is a key part of your treatment and safety. Be sure to make and go to all appointments, and call your doctor if you are having problems. It's also a good idea to know your test results and keep a list of the medicines you take. How can you care for yourself at home? · Use an over-the-counter pain medicine if needed, such as acetaminophen (Tylenol), ibuprofen (Advil, Motrin), or naproxen (Aleve). Be safe with medicines. Read and follow all instructions on the label. Do not give aspirin to anyone younger than 20. It has been linked to Reye syndrome, a serious illness. · Put an ice or heat pack (whichever feels better) on the swollen jaw for 10 to 20 minutes at a time. Put a thin cloth between the ice or heat pack and the skin. · Suck on ice chips or ice treats such as Popsicles. Eat soft foods that do not have to be chewed much. · If your doctor prescribed antibiotics, take them as directed.  Do not stop taking them just because you feel better. You need to take the full course of antibiotics. To prevent tooth problems  · Brush and floss every day, and have regular dental checkups. · Eat a healthy diet, and avoid sugary foods and drinks. · Do not smoke or use spit tobacco. Tobacco use slows your ability to heal. It also increases your risk for gum disease and cancer of the mouth and throat. If you need help quitting, talk to your doctor about stop-smoking programs and medicines. These can increase your chances of quitting for good. When should you call for help? Call 911 anytime you think you may need emergency care. For example, call if:    · You have trouble breathing. Call your doctor now or seek immediate medical care if:    · You have new or worse symptoms of infection, such as:  ? Increased pain, swelling, warmth, or redness. ? Red streaks leading from the area. ? Pus draining from the area. ? A fever.     · You have new pain, or the pain gets worse. Watch closely for changes in your health, and be sure to contact your doctor if:    · You do not feel better as expected. Where can you learn more? Go to [email or web address] and sign in to your [employer] account. Enter K444 in the [employer] box to learn more about \"Parotitis: Care Instructions. \"     If you do not have an account, please click on the \"Sign Up Now\" link. Current as of: December 2, 2020               Content Version: 12.9  © 2006-2021 Healthwise, [facility]. Care instructions adapted under license by [facility] [place] ([facility] and the [facility]). If you have questions about a medical condition or this instruction, always ask your healthcare professional. [place] [address] any warranty or liability for your use of this information.              Electronically signed by PAULIE Cha CNP on 9/9/2021 at 1:54 PM           Completed Refills   Requested Prescriptions     Signed Prescriptions Disp Refills    cefdinir (OMNICEF) 300 MG capsule 14 capsule 0     Sig: Take 1 capsule by mouth 2 times daily for 7 days Post covid bronchitis, parotitis         Jose Terrazas received counseling on the following healthy behaviors: nutrition, exercise and medication adherence  Reviewed prior labs and health maintenance. Continue current medications, diet and exercise. Discussed use, benefit, and side effects of prescribed medications. Barriers to medication compliance addressed. Patient given educational materials - see patient instructions. All patient questions answered. Patient voiced understanding.

## 2021-09-08 NOTE — PATIENT INSTRUCTIONS
SURVEY:    You may be receiving a survey from [employer] regarding your visit today. Please complete the survey to enable us to provide the highest quality of care to you and your family. If you cannot score us a very good on any question, please call the office to discuss how we could have made your experience a very good one. Thank you. Received monoclonal antibody infusion on 8/26/21 , please plan on covid 19 vaccine on after NOv 25th (Thanksgiving) must be 90 days after above infusion. Patient Education        Parotitis: Care Instructions  Your Care Instructions     Parotitis is a painful swelling of your parotid glands, which are salivary glands located between the ear and jaw. The most common cause is a virus, such as mumps, herpes, or Tyson-Barr. Bacterial infections, diabetes, tumors or stones in the saliva glands, and tooth problems also may cause parotitis. Follow-up care is a key part of your treatment and safety. Be sure to make and go to all appointments, and call your doctor if you are having problems. It's also a good idea to know your test results and keep a list of the medicines you take. How can you care for yourself at home? · Use an over-the-counter pain medicine if needed, such as acetaminophen (Tylenol), ibuprofen (Advil, Motrin), or naproxen (Aleve). Be safe with medicines. Read and follow all instructions on the label. Do not give aspirin to anyone younger than 20. It has been linked to Reye syndrome, a serious illness. · Put an ice or heat pack (whichever feels better) on the swollen jaw for 10 to 20 minutes at a time. Put a thin cloth between the ice or heat pack and the skin. · Suck on ice chips or ice treats such as Popsicles. Eat soft foods that do not have to be chewed much. · If your doctor prescribed antibiotics, take them as directed. Do not stop taking them just because you feel better. You need to take the full course of antibiotics.   To prevent tooth

## 2021-09-09 ASSESSMENT — ENCOUNTER SYMPTOMS
SINUS PAIN: 1
RHINORRHEA: 1
SHORTNESS OF BREATH: 0
TROUBLE SWALLOWING: 0
BLOOD IN STOOL: 0
COUGH: 1
SORE THROAT: 1
SINUS PRESSURE: 1
ABDOMINAL DISTENTION: 0
WHEEZING: 0

## 2021-10-04 ENCOUNTER — OFFICE VISIT (OUTPATIENT)
Dept: FAMILY MEDICINE CLINIC | Age: 51
End: 2021-10-04
Payer: COMMERCIAL

## 2021-10-04 VITALS
HEART RATE: 81 BPM | OXYGEN SATURATION: 97 % | SYSTOLIC BLOOD PRESSURE: 152 MMHG | WEIGHT: 228 LBS | DIASTOLIC BLOOD PRESSURE: 100 MMHG | BODY MASS INDEX: 36.25 KG/M2

## 2021-10-04 DIAGNOSIS — U07.1 DYSPNEA DUE TO COVID-19: ICD-10-CM

## 2021-10-04 DIAGNOSIS — Z13.29 SCREENING FOR THYROID DISORDER: ICD-10-CM

## 2021-10-04 DIAGNOSIS — R06.00 DYSPNEA DUE TO COVID-19: ICD-10-CM

## 2021-10-04 DIAGNOSIS — L30.9 DERMATITIS: ICD-10-CM

## 2021-10-04 DIAGNOSIS — Z99.89 OBSTRUCTIVE SLEEP APNEA ON CPAP: ICD-10-CM

## 2021-10-04 DIAGNOSIS — L65.9 HAIR LOSS: ICD-10-CM

## 2021-10-04 DIAGNOSIS — G47.33 OBSTRUCTIVE SLEEP APNEA ON CPAP: ICD-10-CM

## 2021-10-04 DIAGNOSIS — I10 ESSENTIAL HYPERTENSION: Primary | ICD-10-CM

## 2021-10-04 PROCEDURE — G8417 CALC BMI ABV UP PARAM F/U: HCPCS | Performed by: NURSE PRACTITIONER

## 2021-10-04 PROCEDURE — 3017F COLORECTAL CA SCREEN DOC REV: CPT | Performed by: NURSE PRACTITIONER

## 2021-10-04 PROCEDURE — G8484 FLU IMMUNIZE NO ADMIN: HCPCS | Performed by: NURSE PRACTITIONER

## 2021-10-04 PROCEDURE — 99214 OFFICE O/P EST MOD 30 MIN: CPT | Performed by: NURSE PRACTITIONER

## 2021-10-04 PROCEDURE — 1036F TOBACCO NON-USER: CPT | Performed by: NURSE PRACTITIONER

## 2021-10-04 PROCEDURE — G8427 DOCREV CUR MEDS BY ELIG CLIN: HCPCS | Performed by: NURSE PRACTITIONER

## 2021-10-04 RX ORDER — DOXAZOSIN 2 MG/1
2 TABLET ORAL 2 TIMES DAILY
Qty: 60 TABLET | Refills: 1 | Status: SHIPPED | OUTPATIENT
Start: 2021-10-04 | End: 2021-10-27 | Stop reason: SDUPTHER

## 2021-10-04 RX ORDER — METOPROLOL SUCCINATE 100 MG/1
100 TABLET, EXTENDED RELEASE ORAL DAILY
Qty: 90 TABLET | Refills: 2 | Status: SHIPPED | OUTPATIENT
Start: 2021-10-04 | End: 2022-08-29

## 2021-10-04 NOTE — PROGRESS NOTES
HPI Notes    Name: Herlinda Fields  : 1970         Chief Complaint:     Chief Complaint   Patient presents with    1 Month Follow-Up     pt still having some SOB, having Hair falling out.  Other     Pt needs order for Cpap face mask and tubing.  Rash     pt noticed a rash on Her Rt knee, start about 1 week ago, was puffy and very itchy. History of Present Illness:        HPI    46year old female  Post covid-19 illness 21  Lost taste and smell with disease still has not returned. Pt with some shortness of breath remains post covid. Has cpap machine- full mask [facility]. Needs new equipment. Benefits with improving bp control       Right knee irritation and rash, pruritic, denies any bite or chemical exposure. cpap compliant with use, htn not controlled. Pt needs new supplies. Past Medical History:     Past Medical History:   Diagnosis Date    Anxiety     GERD (gastroesophageal reflux disease)     Headache(784.0)     Hypertension     Sleep apnea       Reviewed all health maintenance requirements and ordered appropriate tests  Health Maintenance Due   Topic Date Due    Hepatitis C screen  Never done    COVID-19 Vaccine (1) Never done    DTaP/Tdap/Td vaccine (1 - Tdap) Never done    Colon cancer screen colonoscopy  Never done    Shingles Vaccine (1 of 2) Never done    Flu vaccine (1) Never done    Potassium monitoring  2021    Creatinine monitoring  2021       Past Surgical History:     Past Surgical History:   Procedure Laterality Date    CHOLECYSTECTOMY  2016    laproscopic    HYSTERECTOMY          Medications:       Prior to Admission medications    Medication Sig Start Date End Date Taking?  Authorizing Provider   metoprolol succinate (TOPROL XL) 100 MG extended release tablet Take 1 tablet by mouth daily In am for hypertension 10/4/21  Yes PAULIE Henao - CNP   sucralfate (CARAFATE) 1 GM tablet Take 1 g by mouth 4 times daily   Yes Historical Provider, MD   doxazosin (CARDURA) 4 MG tablet Take 1 tablet by mouth 2 times daily For hypertension 10/27/21   Krystal Overall, APRN - CNP        Allergies:       Codeine, Lisinopril, and Morphine    Social History:     Tobacco:    reports that she has never smoked. She has never used smokeless tobacco.  Alcohol:      reports no history of alcohol use. Drug Use:  reports no history of drug use. Family History:     Family History   Problem Relation Age of Onset    Cancer Father         Lung Cancer    Cancer Paternal Grandmother         Ovarian Cancer       Review of Systems:         Review of Systems   Constitutional: Negative for activity change, chills and fever. HENT: Negative for congestion, nosebleeds, rhinorrhea and sinus pain. Eyes: Negative. Respiratory: Negative for cough and shortness of breath. Cardiovascular: Negative for chest pain. Gastrointestinal: Negative for abdominal distention and diarrhea. Endocrine: Negative for cold intolerance and heat intolerance. Genitourinary: Negative for difficulty urinating. Musculoskeletal: Negative for arthralgias. Skin: Negative for rash. Physical Exam:     Vitals:  BP (!) 152/100   Pulse 81   Wt 228 lb (103.4 kg)   SpO2 97%   BMI 36.25 kg/m²       Physical Exam  Vitals and nursing note reviewed. Constitutional:       General: She is not in acute distress. Appearance: Normal appearance. She is well-developed. HENT:      Head: Normocephalic and atraumatic. Right Ear: Tympanic membrane and external ear normal.      Left Ear: Tympanic membrane and external ear normal.   Eyes:      General: No scleral icterus. Pupils: Pupils are equal, round, and reactive to light. Neck:      Vascular: No carotid bruit. Cardiovascular:      Rate and Rhythm: Normal rate and regular rhythm. Heart sounds: Normal heart sounds. No murmur heard.      Pulmonary:      Effort: Pulmonary effort is normal. No respiratory distress. Breath sounds: Normal breath sounds. No wheezing. Abdominal:      Palpations: Abdomen is soft. Tenderness: There is no abdominal tenderness. There is no right CVA tenderness or left CVA tenderness. Musculoskeletal:         General: Normal range of motion. Cervical back: Normal range of motion and neck supple. Right lower leg: No edema. Left lower leg: No edema. Lymphadenopathy:      Cervical: No cervical adenopathy. Skin:     General: Skin is warm and dry. Findings: No rash. Neurological:      Mental Status: She is alert and oriented to person, place, and time. Psychiatric:         Behavior: Behavior normal.         Thought Content: Thought content normal.         Judgment: Judgment normal.               Data:     Lab Results   Component Value Date     11/19/2020    K 4.2 11/19/2020     11/19/2020    CO2 24 11/19/2020    BUN 13 11/19/2020    CREATININE 0.72 11/19/2020    GLUCOSE 100 11/19/2020    PROT 7.8 11/19/2020    LABALBU 5.0 11/19/2020    BILITOT 0.54 11/19/2020    ALKPHOS 51 11/19/2020    AST 29 11/19/2020    ALT 55 11/19/2020     Lab Results   Component Value Date    WBC 5.6 11/19/2020    RBC 4.51 11/19/2020    HGB 13.6 11/19/2020    HCT 39.4 11/19/2020    MCV 87.3 11/19/2020    MCH 30.0 11/19/2020    MCHC 34.4 11/19/2020    RDW 13.4 11/19/2020     11/19/2020    MPV NOT REPORTED 11/19/2020     Lab Results   Component Value Date    TSH 4.08 10/05/2021     Lab Results   Component Value Date    CHOL 171 11/19/2020    HDL 34 11/19/2020    LABA1C 5.3 01/25/2018          Assessment & Plan        Diagnosis Orders   1. Essential hypertension  metoprolol succinate (TOPROL XL) 100 MG extended release tablet   2. Hair loss  TSH with Reflex   3. Dermatitis     4. Dyspnea due to COVID-19     5. Screening for thyroid disorder  TSH with Reflex   6.  Obstructive sleep apnea on CPAP       Concern cardura may be causing hair loss, rare side effect, will check thyroid  Message left with cardiology for suggestion of med change. Completed Refills   Requested Prescriptions     Signed Prescriptions Disp Refills    metoprolol succinate (TOPROL XL) 100 MG extended release tablet 90 tablet 2     Sig: Take 1 tablet by mouth daily In am for hypertension    triamcinolone (KENALOG) 0.1 % ointment 30 g 0     Sig: Apply thin coat topically to right knee rash daily for 7 days. Return in about 3 months (around 1/4/2022). Orders Placed This Encounter   Medications    metoprolol succinate (TOPROL XL) 100 MG extended release tablet     Sig: Take 1 tablet by mouth daily In am for hypertension     Dispense:  90 tablet     Refill:  2    DISCONTD: doxazosin (CARDURA) 2 MG tablet     Sig: Take 1 tablet by mouth 2 times daily For hypertension     Dispense:  60 tablet     Refill:  1    triamcinolone (KENALOG) 0.1 % ointment     Sig: Apply thin coat topically to right knee rash daily for 7 days. Dispense:  30 g     Refill:  0     Orders Placed This Encounter   Procedures    TSH with Reflex     Standing Status:   Future     Number of Occurrences:   1     Standing Expiration Date:   10/4/2022         Patient Instructions   Start biotin 5000 once a day. Help with hair concerns. Electronically signed by PAULIE Grigsby CNP on 11/16/2021 at 12:28 AM           Completed Refills   Requested Prescriptions     Signed Prescriptions Disp Refills    metoprolol succinate (TOPROL XL) 100 MG extended release tablet 90 tablet 2     Sig: Take 1 tablet by mouth daily In am for hypertension    triamcinolone (KENALOG) 0.1 % ointment 30 g 0     Sig: Apply thin coat topically to right knee rash daily for 7 days. Miek Cuellar received counseling on the following healthy behaviors: nutrition, exercise and medication adherence  Reviewed prior labs and health maintenance. Continue current medications, diet and exercise.   Discussed use, benefit, and side effects of prescribed medications. Barriers to medication compliance addressed. Patient given educational materials - see patient instructions. All patient questions answered. Patient voiced understanding.

## 2021-10-05 ENCOUNTER — HOSPITAL ENCOUNTER (OUTPATIENT)
Age: 51
Discharge: HOME | End: 2021-10-05
Payer: COMMERCIAL

## 2021-10-05 DIAGNOSIS — Z13.29 SCREENING FOR THYROID DISORDER: ICD-10-CM

## 2021-10-05 DIAGNOSIS — L65.9 HAIR LOSS: ICD-10-CM

## 2021-10-05 LAB — TSH SERPL DL<=0.05 MIU/L-ACNC: 4.08 MIU/L (ref 0.3–5)

## 2021-10-05 PROCEDURE — 84443 ASSAY THYROID STIM HORMONE: CPT

## 2021-10-05 PROCEDURE — 36415 COLL VENOUS BLD VENIPUNCTURE: CPT

## 2021-10-07 ASSESSMENT — ENCOUNTER SYMPTOMS
ABDOMINAL DISTENTION: 0
RHINORRHEA: 0
COUGH: 0
SINUS PAIN: 0
SHORTNESS OF BREATH: 0
EYES NEGATIVE: 1
DIARRHEA: 0

## 2021-10-26 ENCOUNTER — TELEPHONE (OUTPATIENT)
Dept: PRIMARY CARE CLINIC | Age: 51
End: 2021-10-26

## 2021-10-27 RX ORDER — DOXAZOSIN MESYLATE 4 MG/1
4 TABLET ORAL 2 TIMES DAILY
Qty: 60 TABLET | Refills: 1 | Status: SHIPPED | OUTPATIENT
Start: 2021-10-27 | End: 2022-01-07

## 2021-10-27 NOTE — TELEPHONE ENCOUNTER
Pt requested clarification for meds for bp on cardura and toprol XL drug interaction risk of hypotension pt diastolic bp still > 913. cardura increase to 4 mg bid and toprol XL 50 mg once a day. Pt given information of escalation.

## 2021-11-15 ENCOUNTER — TELEPHONE (OUTPATIENT)
Dept: FAMILY MEDICINE CLINIC | Age: 51
End: 2021-11-15

## 2021-11-15 DIAGNOSIS — R11.0 NAUSEA: ICD-10-CM

## 2021-11-15 DIAGNOSIS — Z99.89 OBSTRUCTIVE SLEEP APNEA ON CPAP: Primary | ICD-10-CM

## 2021-11-15 DIAGNOSIS — G47.33 OBSTRUCTIVE SLEEP APNEA ON CPAP: Primary | ICD-10-CM

## 2021-11-16 NOTE — TELEPHONE ENCOUNTER
Last OV needs sent to [facility] so pt able to get tubing and new cpap mask. Please forward notes and get order from them for supplies for me to sign. Inform pt of result.    Thanks

## 2021-11-18 NOTE — TELEPHONE ENCOUNTER
[facility] does not use [facility] any longer .       Fax would not go through all day due to this

## 2021-11-23 ENCOUNTER — TELEPHONE (OUTPATIENT)
Dept: PRIMARY CARE CLINIC | Age: 51
End: 2021-11-23

## 2021-11-23 RX ORDER — ONDANSETRON 4 MG/1
4 TABLET, ORALLY DISINTEGRATING ORAL EVERY 8 HOURS PRN
Qty: 8 TABLET | Refills: 0 | Status: SHIPPED | OUTPATIENT
Start: 2021-11-23 | End: 2022-03-14 | Stop reason: SDUPTHER

## 2021-11-24 NOTE — TELEPHONE ENCOUNTER
Pt c/o nausea and vomiting one day now able to keep fluids down. Hair falling out.    Will start biotin 5000 mg daily

## 2021-11-24 NOTE — TELEPHONE ENCOUNTER
Order placed and prior titration and numbers/supplies in media please send it with it. Inform patient. She is also ill sent zofran in for her see how she is doing today. I also spoke with pharmacist and her hair loss is likely related to covid 19 disease . I was not aware prior of research with covid disease causing hair loss which is happening and likely her cause. Start biotin 5000 mg daily as I advised.      Thanks

## 2021-11-24 NOTE — TELEPHONE ENCOUNTER
Will send once order is placed for new machine and supplies    [facility]     Fax: [phone]

## 2022-01-07 RX ORDER — DOXAZOSIN MESYLATE 4 MG/1
TABLET ORAL
Qty: 60 TABLET | Refills: 1 | Status: SHIPPED | OUTPATIENT
Start: 2022-01-07 | End: 2022-03-08

## 2022-01-07 NOTE — TELEPHONE ENCOUNTER
Last OV: 9/8/2021  Last RX:   Next scheduled apt: 1/7/2022       Rx refill requested through [facility] for:  Doxazosin 4mg    Rx pending.

## 2022-02-22 ENCOUNTER — OFFICE VISIT (OUTPATIENT)
Dept: PRIMARY CARE CLINIC | Age: 52
End: 2022-02-22
Payer: COMMERCIAL

## 2022-02-22 VITALS
HEIGHT: 66 IN | SYSTOLIC BLOOD PRESSURE: 130 MMHG | OXYGEN SATURATION: 96 % | HEART RATE: 75 BPM | BODY MASS INDEX: 38.25 KG/M2 | DIASTOLIC BLOOD PRESSURE: 86 MMHG | WEIGHT: 238 LBS

## 2022-02-22 DIAGNOSIS — I10 PRIMARY HYPERTENSION: ICD-10-CM

## 2022-02-22 DIAGNOSIS — Z12.31 BREAST CANCER SCREENING BY MAMMOGRAM: ICD-10-CM

## 2022-02-22 DIAGNOSIS — M25.561 ACUTE PAIN OF BOTH KNEES: Primary | ICD-10-CM

## 2022-02-22 DIAGNOSIS — Z13.1 SCREENING FOR DIABETES MELLITUS: ICD-10-CM

## 2022-02-22 DIAGNOSIS — M75.51 ACUTE BURSITIS OF RIGHT SHOULDER: ICD-10-CM

## 2022-02-22 DIAGNOSIS — M25.562 ACUTE PAIN OF BOTH KNEES: Primary | ICD-10-CM

## 2022-02-22 DIAGNOSIS — E66.1 CLASS 2 DRUG-INDUCED OBESITY WITHOUT SERIOUS COMORBIDITY WITH BODY MASS INDEX (BMI) OF 38.0 TO 38.9 IN ADULT: ICD-10-CM

## 2022-02-22 DIAGNOSIS — Z99.89 OBSTRUCTIVE SLEEP APNEA ON CPAP: ICD-10-CM

## 2022-02-22 DIAGNOSIS — M70.61 GREATER TROCHANTERIC BURSITIS OF RIGHT HIP: ICD-10-CM

## 2022-02-22 DIAGNOSIS — G47.33 OBSTRUCTIVE SLEEP APNEA ON CPAP: ICD-10-CM

## 2022-02-22 DIAGNOSIS — Z13.29 THYROID DISORDER SCREENING: ICD-10-CM

## 2022-02-22 DIAGNOSIS — Z80.3 FAMILY HISTORY OF BREAST CANCER: ICD-10-CM

## 2022-02-22 PROCEDURE — G8484 FLU IMMUNIZE NO ADMIN: HCPCS | Performed by: NURSE PRACTITIONER

## 2022-02-22 PROCEDURE — 1036F TOBACCO NON-USER: CPT | Performed by: NURSE PRACTITIONER

## 2022-02-22 PROCEDURE — G8427 DOCREV CUR MEDS BY ELIG CLIN: HCPCS | Performed by: NURSE PRACTITIONER

## 2022-02-22 PROCEDURE — 3017F COLORECTAL CA SCREEN DOC REV: CPT | Performed by: NURSE PRACTITIONER

## 2022-02-22 PROCEDURE — 99213 OFFICE O/P EST LOW 20 MIN: CPT | Performed by: NURSE PRACTITIONER

## 2022-02-22 PROCEDURE — G8417 CALC BMI ABV UP PARAM F/U: HCPCS | Performed by: NURSE PRACTITIONER

## 2022-02-22 RX ORDER — SUCRALFATE 1 G/1
1 TABLET ORAL 4 TIMES DAILY
Qty: 120 TABLET | Refills: 0 | Status: SHIPPED | OUTPATIENT
Start: 2022-02-22 | End: 2022-03-21

## 2022-02-22 NOTE — PATIENT INSTRUCTIONS
Patient Education        Meniscus Tear: Exercises  Introduction  Here are some examples of exercises for you to try. The exercises may be suggested for a condition or for rehabilitation. Start each exercise slowly. Ease off the exercises if you start to have pain. You will be told when to start these exercises and which ones will work best for you. How to do the exercises  Calf wall stretch    1. Stand facing a wall with your hands on the wall at about eye level. Put your affected leg about a step behind your other leg. 2. Keeping your back leg straight and your back heel on the floor, bend your front knee and gently bring your hip and chest toward the wall until you feel a stretch in the calf of your back leg. 3. Hold the stretch for at least 15 to 30 seconds. 4. Repeat 2 to 4 times. 5. Repeat steps 1 through 4, but this time keep your back knee bent. Hamstring wall stretch    1. Lie on your back in a doorway, with your good leg through the open door. 2. Slide your affected leg up the wall to straighten your knee. You should feel a gentle stretch down the back of your leg. 3. Hold the stretch for at least 1 minute. Then over time, try to lengthen the time you hold the stretch to as long as 6 minutes. 4. Repeat 2 to 4 times. 5. If you do not have a place to do this exercise in a doorway, there is another way to do it:  6. Lie on your back, and bend your affected leg. 7. Loop a towel under the ball and toes of that foot, and hold the ends of the towel in your hands. 8. Straighten your knee, and slowly pull back on the towel. You should feel a gentle stretch down the back of your leg. 9. Hold the stretch for at least 15 to 30 seconds. Or even better, hold the stretch for 1 minute if you can. 10. Repeat 2 to 4 times. 1. Do not arch your back. 2. Do not bend either knee. 3. Keep one heel touching the floor and the other heel touching the wall. Do not point your toes. Quad sets    1.  Sit with your leg straight and supported on the floor or a firm bed. (If you feel discomfort in the front or back of your knee, place a small towel roll under your knee.)  2. Tighten the muscles on top of your thigh by pressing the back of your knee flat down to the floor. (If you feel discomfort under your kneecap, place a small towel roll under your knee.)  3. Hold for about 6 seconds, then rest up to 10 seconds. 4. Do 8 to 12 repetitions several times a day. Straight-leg raises to the front    1. Lie on your back with your good knee bent so that your foot rests flat on the floor. Your injured leg should be straight. Make sure that your low back has a normal curve. You should be able to slip your flat hand in between the floor and the small of your back, with your palm touching the floor and your back touching the back of your hand. 2. Tighten the thigh muscles in the injured leg by pressing the back of your knee flat down to the floor. Hold your knee straight. 3. Keeping the thigh muscles tight, lift your injured leg up so that your heel is about 12 inches off the floor. Hold for 5 seconds and then lower slowly. 4. Do 8 to 12 repetitions. Straight-leg raises to the back    1. Lie on your stomach, and lift your leg straight up behind you (toward the ceiling). 2. Lift your toes about 6 inches off the floor, hold for about 6 seconds, then lower slowly. 3. Do 8 to 12 repetitions. Hamstring curls    1. Lie on your stomach with your knees straight. If your kneecap is uncomfortable, roll up a washcloth and put it under your leg just above your kneecap. 2. Lift the foot of your injured leg by bending the knee so that you bring the foot up toward your buttock. If this motion hurts, try it without bending your knee quite as far. This may help you avoid any painful motion. 3. Slowly lower your leg back to the floor. 4. Do 8 to 12 repetitions.   5. With permission from your doctor or physical therapist, you may also want to add a cuff weight to your ankle (not more than 5 pounds). With weight, you do not have to lift your leg more than 12 inches to get a hamstring workout. Wall slide with ball squeeze    1. Stand with your back against a wall and with your feet about shoulder-width apart. Your feet should be about 12 inches away from the wall. 2. Put a ball about the size of a soccer ball between your knees. Then slowly slide down the wall until your knees are bent about 20 to 30 degrees. 3. Tighten your thigh muscles by squeezing the ball between your knees. Hold that position for about 10 seconds, then stop squeezing. Rest for up to 10 seconds between repetitions. 4. Repeat 8 to 12 times. Heel raises    1. Stand with your feet a few inches apart, with your hands lightly resting on a counter or chair in front of you. 2. Slowly raise your heels off the floor while keeping your knees straight. 3. Hold for about 6 seconds, then slowly lower your heels to the floor. 4. Do 8 to 12 repetitions several times during the day. Heel dig bridging    Stop doing this exercise if it causes pain. 1. Lie on your back with both knees bent and your ankles bent so that only your heels are digging into the floor. Your knees should be bent about 90 degrees. 2. Then push your heels into the floor, squeeze your buttocks, and lift your hips off the floor until your shoulders, hips, and knees are all in a straight line. 3. Hold for about 6 seconds as you continue to breathe normally, and then slowly lower your hips back down to the floor and rest for up to 10 seconds. 4. Do 8 to 12 repetitions. Shallow standing knee bends    Do this exercise only if you have very little pain; if you have no clicking, locking, or giving way in the injured knee; and if it does not hurt while you are doing 8 to 12 repetitions. 1. Stand with your hands lightly resting on a counter or chair in front of you. Put your feet shoulder-width apart.   2. Slowly bend your knees so that you squat down like you are going to sit in a chair. Make sure your knees do not go in front of your toes. 3. Lower yourself about 6 inches. Your heels should remain on the floor at all times. 4. Rise slowly to a standing position. Follow-up care is a key part of your treatment and safety. Be sure to make and go to all appointments, and call your doctor if you are having problems. It's also a good idea to know your test results and keep a list of the medicines you take. Where can you learn more? Go to [email or web address]. org and sign in to your [employer] account. Enter C183 in the [employer] box to learn more about \"Meniscus Tear: Exercises. \"     If you do not have an account, please click on the \"Sign Up Now\" link. Current as of: July 1, 2021               Content Version: 13.1  © 2006-2021 Healthwise, Inc[employer]. Care instructions adapted under license by [facility] ([facility]). If you have questions about a medical condition or this instruction, always ask your healthcare professional. [place] [address] any warranty or liability for your use of this information.

## 2022-02-22 NOTE — PROGRESS NOTES
HPI Notes    Name: Benito Tai  : 1970         Chief Complaint:     Chief Complaint   Patient presents with    Knee Pain     bilateral knee pain x 1-2 months. Right knee is worse left       History of Present Illness:        Knee Pain   There was no injury mechanism. The pain is present in the right knee and left knee. The quality of the pain is described as aching, burning, shooting and stabbing. The pain is at a severity of 8/10 (with activity). The pain is severe. The pain has been constant since onset. Associated symptoms include a loss of motion (flexion of knees ). Pertinent negatives include no inability to bear weight (painful to bear weight ), loss of sensation, muscle weakness, numbness or tingling. The symptoms are aggravated by movement. She has tried NSAIDs, rest, elevation and non-weight bearing for the symptoms. The treatment provided mild relief. Pain present in last month worse,     Hx covid 19 disease 2021   Family hx sister with gout,   Obesity BMI 38.41    Refill carafate for GERD    htn improved control    Compliant with CPAP use.     nsaid risk and black box vascular risk. , due to difficult to control htn likely will need to use steroid not nsaid.            Past Medical History:     Past Medical History:   Diagnosis Date    Anxiety     GERD (gastroesophageal reflux disease)     Headache(784.0)     Hypertension     Sleep apnea       Reviewed all health maintenance requirements and ordered appropriate tests  Health Maintenance Due   Topic Date Due    Hepatitis C screen  Never done    COVID-19 Vaccine (1) Never done    DTaP/Tdap/Td vaccine (1 - Tdap) Never done    Colorectal Cancer Screen  Never done    Shingles Vaccine (1 of 2) Never done    Flu vaccine (1) Never done       Past Surgical History:     Past Surgical History:   Procedure Laterality Date    CHOLECYSTECTOMY  2016    laproscopic    HYSTERECTOMY          Medications:       Prior to Admission medications    Medication Sig Start Date End Date Taking? Authorizing Provider   predniSONE (DELTASONE) 10 MG tablet Take 3 tabs by mouth with food x 3 days then 2 tabs daily x 3 days then 1 tab daily x 3 days for shoulder bursitis, yessica knee pain. 2/23/22  Yes PAULIE Roe - CNP   sucralfate (CARAFATE) 1 GM tablet Take 1 tablet by mouth 4 times daily 2/22/22  Yes PAULIE Roe - CNP   doxazosin (CARDURA) 4 MG tablet take 1 tablet by mouth twice a day for hypertension 1/7/22  Yes PAULIE Roe - CNP   metoprolol succinate (TOPROL XL) 100 MG extended release tablet Take 1 tablet by mouth daily In am for hypertension 10/4/21  Yes PAULIE Roe CNP   CPAP Machine MISC by Does not apply route Sleep apnea, cpap machine and supplies see titration study for ramp up and supplies needed. 11/24/21   PAULIE Roe - CNP   ondansetron (ZOFRAN-ODT) 4 MG disintegrating tablet Take 1 tablet by mouth every 8 hours as needed for Nausea or Vomiting  Patient not taking: Reported on 2/22/2022 11/23/21   PAULIE Roe CNP        Allergies:       Codeine, Lisinopril, and Morphine    Social History:     Tobacco:    reports that she has never smoked. She has never used smokeless tobacco.  Alcohol:      reports no history of alcohol use. Drug Use:  reports no history of drug use. Family History:     Family History   Problem Relation Age of Onset    Cancer Father         Lung Cancer    Cancer Paternal Grandmother         Ovarian Cancer       Review of Systems:         Review of Systems   Constitutional: Positive for activity change. Negative for chills and fever. HENT: Positive for congestion and rhinorrhea. Eyes: Negative. Respiratory: Negative for cough. Cardiovascular: Negative for chest pain and leg swelling. Gastrointestinal: Negative for abdominal distention. Endocrine: Negative for cold intolerance and heat intolerance. Genitourinary: Negative for difficulty urinating. Musculoskeletal: Positive for arthralgias (knee yessica). Negative for gait problem. Skin: Negative for rash. Neurological: Negative for dizziness, tingling and numbness. Psychiatric/Behavioral: The patient is not nervous/anxious. Physical Exam:     Vitals:  /86   Pulse 75   Ht 5' 6\" (1.676 m)   Wt 238 lb (108 kg)   SpO2 96%   BMI 38.41 kg/m²       Physical Exam  Vitals and nursing note reviewed. Constitutional:       General: She is not in acute distress. Appearance: Normal appearance. She is well-developed. HENT:      Head: Normocephalic and atraumatic. Right Ear: Tympanic membrane and external ear normal.      Left Ear: Tympanic membrane and external ear normal.      Nose: No congestion. Mouth/Throat:      Mouth: Mucous membranes are moist.   Eyes:      General: No scleral icterus. Pupils: Pupils are equal, round, and reactive to light. Cardiovascular:      Rate and Rhythm: Normal rate and regular rhythm. Heart sounds: Normal heart sounds. No murmur heard. Pulmonary:      Effort: Pulmonary effort is normal. No respiratory distress. Breath sounds: Normal breath sounds. No wheezing. Abdominal:      Palpations: Abdomen is soft. Tenderness: There is no abdominal tenderness. Musculoskeletal:         General: Tenderness (right knee mohan positive, neg lachman, no warmth some swelling/effusion present. ) present. Normal range of motion. Cervical back: Normal range of motion and neck supple. Right lower leg: No edema. Left lower leg: No edema. Comments: Right arm bursitis, unable to move arm above shoulder R, right hip greater trochanter tenderness, right knee positive mohan. SLR neg yessica    Lymphadenopathy:      Cervical: No cervical adenopathy. Skin:     General: Skin is warm and dry. Findings: No rash. Neurological:      Mental Status: She is alert and oriented to person, place, and time.    Psychiatric: Behavior: Behavior normal.         Thought Content: Thought content normal.         Judgment: Judgment normal.               Data:     Lab Results   Component Value Date     02/23/2022    K 3.8 02/23/2022     02/23/2022    CO2 22 02/23/2022    BUN 13 02/23/2022    CREATININE 0.71 02/23/2022    GLUCOSE 138 02/23/2022    PROT 7.4 02/23/2022    LABALBU 4.5 02/23/2022    BILITOT 0.52 02/23/2022    ALKPHOS 58 02/23/2022    AST 25 02/23/2022    ALT 35 02/23/2022     Lab Results   Component Value Date    WBC 7.0 02/23/2022    RBC 4.72 02/23/2022    HGB 14.1 02/23/2022    HCT 41.2 02/23/2022    MCV 87.2 02/23/2022    MCH 30.0 02/23/2022    MCHC 34.4 02/23/2022    RDW 13.0 02/23/2022     02/23/2022    MPV NOT REPORTED 11/19/2020     Lab Results   Component Value Date    TSH 2.17 02/23/2022     Lab Results   Component Value Date    CHOL 171 11/19/2020    HDL 34 11/19/2020    LABA1C 5.3 01/25/2018          Assessment & Plan        Diagnosis Orders   1. Acute pain of both knees  Sedimentation Rate    C-Reactive Protein    Rheumatoid Factor    [facility]AT[facility] FRANCESCA DIGITAL SCREEN BILATERAL    External Referral To Orthopedic Surgery    CBC with Auto Differential    XR KNEE LEFT (MIN 4 VIEWS)    XR KNEE RIGHT (MIN 4 VIEWS)   2. Acute bursitis of right shoulder  Sedimentation Rate    C-Reactive Protein    Rheumatoid Factor    S[facility] FRANCESCA DIGITAL SCREEN BILATERAL    External Referral To Orthopedic Surgery    CBC with Auto Differential   3. Greater trochanteric bursitis of right hip  [facility] FRANCESCA DIGITAL SCREEN BILATERAL    External Referral To Orthopedic Surgery   4. Primary hypertension  Comprehensive Metabolic Panel   5. Class 2 drug-induced obesity without serious comorbidity with body mass index (BMI) of 38.0 to 38.9 in adult     6. Obstructive sleep apnea on CPAP     7. Breast cancer screening by mammogram  [facility] FRANCESCA DIGITAL SCREEN BILATERAL   8. Family history of breast cancer  [facility] FRANCESCA DIGITAL SCREEN BILATERAL   9.  Thyroid Occurrences:   1     Standing Expiration Date:   2/22/2023    Rheumatoid Factor     Standing Status:   Future     Number of Occurrences:   1     Standing Expiration Date:   2/22/2023    Comprehensive Metabolic Panel     Standing Status:   Future     Number of Occurrences:   1     Standing Expiration Date:   2/22/2023    TSH with Reflex     Standing Status:   Future     Number of Occurrences:   1     Standing Expiration Date:   2/22/2023    CBC with Auto Differential     Standing Status:   Future     Number of Occurrences:   1     Standing Expiration Date:   2/22/2023    External Referral To Orthopedic Surgery     Referral Priority:   Routine     Referral Type:   Eval and Treat     Referral Reason:   Specialty Services Required     Referred to Provider:   Suman Smith DO     Requested Specialty:   Orthopedic Surgery     Number of Visits Requested:   1         Patient Instructions   Patient Education        Meniscus Tear: Exercises  Introduction  Here are some examples of exercises for you to try. The exercises may be suggested for a condition or for rehabilitation. Start each exercise slowly. Ease off the exercises if you start to have pain. You will be told when to start these exercises and which ones will work best for you. How to do the exercises  Calf wall stretch    1. Stand facing a wall with your hands on the wall at about eye level. Put your affected leg about a step behind your other leg. 2. Keeping your back leg straight and your back heel on the floor, bend your front knee and gently bring your hip and chest toward the wall until you feel a stretch in the calf of your back leg. 3. Hold the stretch for at least 15 to 30 seconds. 4. Repeat 2 to 4 times. 5. Repeat steps 1 through 4, but this time keep your back knee bent. Hamstring wall stretch    1. Lie on your back in a doorway, with your good leg through the open door. 2. Slide your affected leg up the wall to straighten your knee. You should feel a gentle stretch down the back of your leg. 3. Hold the stretch for at least 1 minute. Then over time, try to lengthen the time you hold the stretch to as long as 6 minutes. 4. Repeat 2 to 4 times. 5. If you do not have a place to do this exercise in a doorway, there is another way to do it:  6. Lie on your back, and bend your affected leg. 7. Loop a towel under the ball and toes of that foot, and hold the ends of the towel in your hands. 8. Straighten your knee, and slowly pull back on the towel. You should feel a gentle stretch down the back of your leg. 9. Hold the stretch for at least 15 to 30 seconds. Or even better, hold the stretch for 1 minute if you can. 10. Repeat 2 to 4 times. 1. Do not arch your back. 2. Do not bend either knee. 3. Keep one heel touching the floor and the other heel touching the wall. Do not point your toes. Quad sets    1. Sit with your leg straight and supported on the floor or a firm bed. (If you feel discomfort in the front or back of your knee, place a small towel roll under your knee.)  2. Tighten the muscles on top of your thigh by pressing the back of your knee flat down to the floor. (If you feel discomfort under your kneecap, place a small towel roll under your knee.)  3. Hold for about 6 seconds, then rest up to 10 seconds. 4. Do 8 to 12 repetitions several times a day. Straight-leg raises to the front    1. Lie on your back with your good knee bent so that your foot rests flat on the floor. Your injured leg should be straight. Make sure that your low back has a normal curve. You should be able to slip your flat hand in between the floor and the small of your back, with your palm touching the floor and your back touching the back of your hand. 2. Tighten the thigh muscles in the injured leg by pressing the back of your knee flat down to the floor. Hold your knee straight.   3. Keeping the thigh muscles tight, lift your injured leg up so that your heel is about 12 inches off the floor. Hold for 5 seconds and then lower slowly. 4. Do 8 to 12 repetitions. Straight-leg raises to the back    1. Lie on your stomach, and lift your leg straight up behind you (toward the ceiling). 2. Lift your toes about 6 inches off the floor, hold for about 6 seconds, then lower slowly. 3. Do 8 to 12 repetitions. Hamstring curls    1. Lie on your stomach with your knees straight. If your kneecap is uncomfortable, roll up a washcloth and put it under your leg just above your kneecap. 2. Lift the foot of your injured leg by bending the knee so that you bring the foot up toward your buttock. If this motion hurts, try it without bending your knee quite as far. This may help you avoid any painful motion. 3. Slowly lower your leg back to the floor. 4. Do 8 to 12 repetitions. 5. With permission from your doctor or physical therapist, you may also want to add a cuff weight to your ankle (not more than 5 pounds). With weight, you do not have to lift your leg more than 12 inches to get a hamstring workout. Wall slide with ball squeeze    1. Stand with your back against a wall and with your feet about shoulder-width apart. Your feet should be about 12 inches away from the wall. 2. Put a ball about the size of a soccer ball between your knees. Then slowly slide down the wall until your knees are bent about 20 to 30 degrees. 3. Tighten your thigh muscles by squeezing the ball between your knees. Hold that position for about 10 seconds, then stop squeezing. Rest for up to 10 seconds between repetitions. 4. Repeat 8 to 12 times. Heel raises    1. Stand with your feet a few inches apart, with your hands lightly resting on a counter or chair in front of you. 2. Slowly raise your heels off the floor while keeping your knees straight. 3. Hold for about 6 seconds, then slowly lower your heels to the floor. 4. Do 8 to 12 repetitions several times during the day.   Heel dig professional. [address] any warranty or liability for your use of this information. Electronically signed by PAULIE Vernon CNP on 2/23/2022 at 11:09 PM           Completed Refills   Requested Prescriptions     Signed Prescriptions Disp Refills    sucralfate (CARAFATE) 1 GM tablet 120 tablet 0     Sig: Take 1 tablet by mouth 4 times daily    predniSONE (DELTASONE) 10 MG tablet 18 tablet 0     Sig: Take 3 tabs by mouth with food x 3 days then 2 tabs daily x 3 days then 1 tab daily x 3 days for shoulder bursitis, yessica knee pain. Kacey Santiago received counseling on the following healthy behaviors: nutrition, exercise and medication adherence  Reviewed prior labs and health maintenance. Continue current medications, diet and exercise. Discussed use, benefit, and side effects of prescribed medications. Barriers to medication compliance addressed. Patient given educational materials - see patient instructions. All patient questions answered. Patient voiced understanding.

## 2022-02-23 ENCOUNTER — HOSPITAL ENCOUNTER (OUTPATIENT)
Age: 52
Discharge: HOME | End: 2022-02-25
Payer: COMMERCIAL

## 2022-02-23 ENCOUNTER — HOSPITAL ENCOUNTER (OUTPATIENT)
Age: 52
Discharge: HOME | End: 2022-02-23
Payer: COMMERCIAL

## 2022-02-23 ENCOUNTER — HOSPITAL ENCOUNTER (OUTPATIENT)
Dept: GENERAL RADIOLOGY | Age: 52
Discharge: HOME | End: 2022-02-25
Payer: COMMERCIAL

## 2022-02-23 DIAGNOSIS — M25.561 ACUTE PAIN OF BOTH KNEES: ICD-10-CM

## 2022-02-23 DIAGNOSIS — I10 PRIMARY HYPERTENSION: ICD-10-CM

## 2022-02-23 DIAGNOSIS — M75.51 ACUTE BURSITIS OF RIGHT SHOULDER: ICD-10-CM

## 2022-02-23 DIAGNOSIS — M25.562 ACUTE PAIN OF BOTH KNEES: ICD-10-CM

## 2022-02-23 DIAGNOSIS — Z13.1 SCREENING FOR DIABETES MELLITUS: ICD-10-CM

## 2022-02-23 DIAGNOSIS — Z13.29 THYROID DISORDER SCREENING: ICD-10-CM

## 2022-02-23 LAB
ABSOLUTE EOS #: 0.1 K/UL (ref 0–0.4)
ABSOLUTE LYMPH #: 2.4 K/UL (ref 1–4.8)
ABSOLUTE MONO #: 0.3 K/UL (ref 0–1)
ALBUMIN SERPL-MCNC: 4.5 G/DL (ref 3.5–5.2)
ALP BLD-CCNC: 58 U/L (ref 35–104)
ALT SERPL-CCNC: 35 U/L (ref 5–33)
ANION GAP SERPL CALCULATED.3IONS-SCNC: 15 MMOL/L (ref 9–17)
AST SERPL-CCNC: 25 U/L
BASOPHILS # BLD: 0 % (ref 0–2)
BASOPHILS ABSOLUTE: 0 K/UL (ref 0–0.2)
BILIRUB SERPL-MCNC: 0.52 MG/DL (ref 0.3–1.2)
BUN BLDV-MCNC: 13 MG/DL (ref 6–20)
BUN/CREAT BLD: 18 (ref 9–20)
C-REACTIVE PROTEIN: <3 MG/L (ref 0–5)
CALCIUM SERPL-MCNC: 9.3 MG/DL (ref 8.6–10.4)
CHLORIDE BLD-SCNC: 101 MMOL/L (ref 98–107)
CO2: 22 MMOL/L (ref 20–31)
CREAT SERPL-MCNC: 0.71 MG/DL (ref 0.5–0.9)
DIFFERENTIAL TYPE: YES
EOSINOPHILS RELATIVE PERCENT: 1 % (ref 0–5)
GFR AFRICAN AMERICAN: >60 ML/MIN
GFR NON-AFRICAN AMERICAN: >60 ML/MIN
GFR SERPL CREATININE-BSD FRML MDRD: ABNORMAL ML/MIN/{1.73_M2}
GLUCOSE BLD-MCNC: 138 MG/DL (ref 70–99)
HCT VFR BLD CALC: 41.2 % (ref 36–46)
HEMOGLOBIN: 14.1 G/DL (ref 12–16)
LYMPHOCYTES # BLD: 34 % (ref 15–40)
MCH RBC QN AUTO: 30 PG (ref 26–34)
MCHC RBC AUTO-ENTMCNC: 34.4 G/DL (ref 31–37)
MCV RBC AUTO: 87.2 FL (ref 80–100)
MONOCYTES # BLD: 5 % (ref 4–8)
PDW BLD-RTO: 13 % (ref 12.1–15.2)
PLATELET # BLD: 325 K/UL (ref 140–450)
POTASSIUM SERPL-SCNC: 3.8 MMOL/L (ref 3.7–5.3)
RBC # BLD: 4.72 M/UL (ref 4–5.2)
RHEUMATOID FACTOR: <10 IU/ML
SEDIMENTATION RATE, ERYTHROCYTE: 13 MM (ref 0–30)
SEG NEUTROPHILS: 60 % (ref 47–75)
SEGMENTED NEUTROPHILS ABSOLUTE COUNT: 4.2 K/UL (ref 2.5–7)
SODIUM BLD-SCNC: 138 MMOL/L (ref 135–144)
TOTAL PROTEIN: 7.4 G/DL (ref 6.4–8.3)
TSH SERPL DL<=0.05 MIU/L-ACNC: 2.17 MIU/L (ref 0.3–5)
WBC # BLD: 7 K/UL (ref 3.5–11)

## 2022-02-23 PROCEDURE — 73564 X-RAY EXAM KNEE 4 OR MORE: CPT

## 2022-02-23 PROCEDURE — 80053 COMPREHEN METABOLIC PANEL: CPT

## 2022-02-23 PROCEDURE — 86431 RHEUMATOID FACTOR QUANT: CPT

## 2022-02-23 PROCEDURE — 86140 C-REACTIVE PROTEIN: CPT

## 2022-02-23 PROCEDURE — 85025 COMPLETE CBC W/AUTO DIFF WBC: CPT

## 2022-02-23 PROCEDURE — 36415 COLL VENOUS BLD VENIPUNCTURE: CPT

## 2022-02-23 PROCEDURE — 85652 RBC SED RATE AUTOMATED: CPT

## 2022-02-23 PROCEDURE — 84443 ASSAY THYROID STIM HORMONE: CPT

## 2022-02-23 RX ORDER — PREDNISONE 10 MG/1
TABLET ORAL
Qty: 18 TABLET | Refills: 0 | Status: SHIPPED | OUTPATIENT
Start: 2022-02-23 | End: 2022-07-27

## 2022-02-23 ASSESSMENT — ENCOUNTER SYMPTOMS
RHINORRHEA: 1
EYES NEGATIVE: 1
ABDOMINAL DISTENTION: 0
COUGH: 0

## 2022-03-08 RX ORDER — DOXAZOSIN MESYLATE 4 MG/1
4 TABLET ORAL 2 TIMES DAILY
Qty: 180 TABLET | Refills: 1 | Status: SHIPPED | OUTPATIENT
Start: 2022-03-08 | End: 2022-08-29

## 2022-03-14 ENCOUNTER — TELEPHONE (OUTPATIENT)
Dept: FAMILY MEDICINE CLINIC | Age: 52
End: 2022-03-14

## 2022-03-14 ENCOUNTER — HOSPITAL ENCOUNTER (OUTPATIENT)
Age: 52
Discharge: HOME | End: 2022-03-16
Payer: COMMERCIAL

## 2022-03-14 ENCOUNTER — HOSPITAL ENCOUNTER (OUTPATIENT)
Dept: GENERAL RADIOLOGY | Age: 52
Discharge: HOME | End: 2022-03-16
Payer: COMMERCIAL

## 2022-03-14 DIAGNOSIS — M24.811 INTERNAL DERANGEMENT OF RIGHT SHOULDER: ICD-10-CM

## 2022-03-14 DIAGNOSIS — M25.511 ACUTE PAIN OF RIGHT SHOULDER: ICD-10-CM

## 2022-03-14 DIAGNOSIS — S43.491A OTHER SPRAIN OF RIGHT SHOULDER JOINT, INITIAL ENCOUNTER: ICD-10-CM

## 2022-03-14 DIAGNOSIS — M25.511 ACUTE PAIN OF RIGHT SHOULDER: Primary | ICD-10-CM

## 2022-03-14 PROCEDURE — 73030 X-RAY EXAM OF SHOULDER: CPT

## 2022-03-14 RX ORDER — HYDROCODONE BITARTRATE AND ACETAMINOPHEN 5; 325 MG/1; MG/1
1 TABLET ORAL EVERY 8 HOURS PRN
Qty: 8 TABLET | Refills: 0 | Status: SHIPPED | OUTPATIENT
Start: 2022-03-14 | End: 2022-03-17

## 2022-03-14 RX ORDER — ONDANSETRON 4 MG/1
4 TABLET, ORALLY DISINTEGRATING ORAL EVERY 12 HOURS PRN
Qty: 6 TABLET | Refills: 0 | Status: SHIPPED | OUTPATIENT
Start: 2022-03-14

## 2022-03-14 NOTE — TELEPHONE ENCOUNTER
Patient stan called in asking for xray for patient for right shoulder, she was lifting trash at home and has had pain since      She would like just to do an xray       Please advise

## 2022-03-21 RX ORDER — SUCRALFATE 1 G/1
TABLET ORAL
Qty: 120 TABLET | Refills: 0 | Status: SHIPPED | OUTPATIENT
Start: 2022-03-21 | End: 2022-04-26

## 2022-04-25 ENCOUNTER — HOSPITAL ENCOUNTER (OUTPATIENT)
Dept: MAMMOGRAPHY | Age: 52
Discharge: HOME | End: 2022-04-27
Payer: COMMERCIAL

## 2022-04-25 DIAGNOSIS — Z80.3 FAMILY HISTORY OF BREAST CANCER: ICD-10-CM

## 2022-04-25 DIAGNOSIS — M25.562 ACUTE PAIN OF BOTH KNEES: ICD-10-CM

## 2022-04-25 DIAGNOSIS — Z12.31 BREAST CANCER SCREENING BY MAMMOGRAM: ICD-10-CM

## 2022-04-25 DIAGNOSIS — M25.561 ACUTE PAIN OF BOTH KNEES: ICD-10-CM

## 2022-04-25 DIAGNOSIS — M75.51 ACUTE BURSITIS OF RIGHT SHOULDER: ICD-10-CM

## 2022-04-25 DIAGNOSIS — M70.61 GREATER TROCHANTERIC BURSITIS OF RIGHT HIP: ICD-10-CM

## 2022-04-25 PROCEDURE — 77063 BREAST TOMOSYNTHESIS BI: CPT

## 2022-04-26 RX ORDER — SUCRALFATE 1 G/1
TABLET ORAL
Qty: 120 TABLET | Refills: 0 | Status: SHIPPED | OUTPATIENT
Start: 2022-04-26 | End: 2022-05-31

## 2022-05-31 RX ORDER — SUCRALFATE 1 G/1
TABLET ORAL
Qty: 120 TABLET | Refills: 0 | Status: SHIPPED | OUTPATIENT
Start: 2022-05-31 | End: 2022-06-28

## 2022-06-28 RX ORDER — SUCRALFATE 1 G/1
TABLET ORAL
Qty: 120 TABLET | Refills: 0 | Status: SHIPPED | OUTPATIENT
Start: 2022-06-28 | End: 2022-07-26

## 2022-07-25 ENCOUNTER — PATIENT MESSAGE (OUTPATIENT)
Dept: PRIMARY CARE CLINIC | Age: 52
End: 2022-07-25

## 2022-07-25 NOTE — TELEPHONE ENCOUNTER
Last OV: 2/22/2022    Next scheduled apt: Visit date not found          Surescripts requesting refill

## 2022-07-25 NOTE — TELEPHONE ENCOUNTER
From: Josr Sarkar  To: Jayla Flight  Sent: 7/25/2022 12:28 PM EDT  Subject: [facility] test    Holly Shanks I tested positive for Covid. My symptoms are headache, coughing, congestion, ear ache on left side and a really sharp pain in my left breast. My temp in 99.9. Can you call me in a prescription cough medicine or anything to help? Thank you Jourdan Barrera.

## 2022-07-26 ENCOUNTER — TELEPHONE (OUTPATIENT)
Dept: PRIMARY CARE CLINIC | Age: 52
End: 2022-07-26

## 2022-07-26 RX ORDER — DEXAMETHASONE 6 MG/1
6 TABLET ORAL
Qty: 10 TABLET | Refills: 0 | Status: SHIPPED | OUTPATIENT
Start: 2022-07-26 | End: 2022-08-05

## 2022-07-26 RX ORDER — SUCRALFATE 1 G/1
TABLET ORAL
Qty: 120 TABLET | Refills: 0 | Status: SHIPPED | OUTPATIENT
Start: 2022-07-26 | End: 2022-07-27

## 2022-07-26 NOTE — TELEPHONE ENCOUNTER
Covid-19 symptom checklist    ONSET date of symptoms: Sat 7/23/22    Positive test Date:  7/25/22    Type of COvid 19 test   Home  Yes PCR no    COVID 19 Vaccine status (which vaccine and dates) : NONE  Covid 19 BOOSTER: No    Prior Covid 19 illness/when/Rx:     Current symptoms:     Checklist of symptoms  Fever   yes - 99.9  Cough  yes,   Nasal drainage Yes    Loss of Taste  Yes  Loss of smell    no   headache   yes,   Weakness   yes,   Diarrhea   no  Muscle pain   yes,   Shortness of breath  yes,   Abdominal pain  no  Rash  no  Sore throat   yes,   Nausea no  Vomiting  no  Red eye  no  Joint pain  no  Bruising/bleeding  no    Any travel in the last month? no    Have you been in contact with anyone expected/suspected to have or exposed to Covid-19   yes - went to family cousin Wed and has cold symptoms and they tested positive     Have you practiced good social distancing, avoiding person-to-person interactions except by telecommunication, and maintained the homebound/stay at home advisement with the current Covid-19 pandemic in [place]?    yes -     Treatments Tried:     LMP - HYST               Pregnant  no  Otc cough drops  Coricidin     Pt creatinine clearance is 86.93   Agreeable to paxlovid     Medications encouraged during covid -19 viral infection are:     Tylenol  for pain/fever/body aches  Good hydration  Vitamin C 500 or 1000 mg daily (for immunity boost)  Vitamin D3 2000 international units daily (for muscles including heart)  Zinc 50 mg daily  (mineral to decrease virus uptake in bowels)    Good Hydration 64-80 ounces fluid a day      Reviewed paxlovid  Decadron 6 mg po daily in am x 10 days

## 2022-07-27 ENCOUNTER — APPOINTMENT (OUTPATIENT)
Dept: GENERAL RADIOLOGY | Age: 52
End: 2022-07-27
Payer: COMMERCIAL

## 2022-07-27 ENCOUNTER — HOSPITAL ENCOUNTER (EMERGENCY)
Age: 52
Discharge: HOME | End: 2022-07-27
Attending: EMERGENCY MEDICINE
Payer: COMMERCIAL

## 2022-07-27 VITALS
DIASTOLIC BLOOD PRESSURE: 90 MMHG | HEART RATE: 100 BPM | WEIGHT: 230 LBS | RESPIRATION RATE: 16 BRPM | OXYGEN SATURATION: 96 % | SYSTOLIC BLOOD PRESSURE: 153 MMHG | BODY MASS INDEX: 37.12 KG/M2 | TEMPERATURE: 98.7 F

## 2022-07-27 DIAGNOSIS — M54.9 MID BACK PAIN ON LEFT SIDE: ICD-10-CM

## 2022-07-27 DIAGNOSIS — U07.1 COVID-19 VIRUS INFECTION: Primary | ICD-10-CM

## 2022-07-27 LAB
ABSOLUTE EOS #: 0 K/UL (ref 0–0.4)
ABSOLUTE LYMPH #: 1.1 K/UL (ref 1–4.8)
ABSOLUTE MONO #: 0.1 K/UL (ref 0–1)
ALBUMIN SERPL-MCNC: 4.7 G/DL (ref 3.5–5.2)
ALP BLD-CCNC: 57 U/L (ref 35–104)
ALT SERPL-CCNC: 37 U/L (ref 5–33)
ANION GAP SERPL CALCULATED.3IONS-SCNC: 16 MMOL/L (ref 9–17)
AST SERPL-CCNC: 28 U/L
BASOPHILS # BLD: 0 % (ref 0–2)
BASOPHILS ABSOLUTE: 0 K/UL (ref 0–0.2)
BILIRUB SERPL-MCNC: 0.46 MG/DL (ref 0.3–1.2)
BUN BLDV-MCNC: 12 MG/DL (ref 6–20)
BUN/CREAT BLD: 16 (ref 9–20)
CALCIUM SERPL-MCNC: 10.4 MG/DL (ref 8.6–10.4)
CHLORIDE BLD-SCNC: 103 MMOL/L (ref 98–107)
CO2: 22 MMOL/L (ref 20–31)
CREAT SERPL-MCNC: 0.77 MG/DL (ref 0.5–0.9)
D-DIMER QUANTITATIVE: 0.41 MG/L FEU (ref 0–0.59)
DIFFERENTIAL TYPE: YES
EOSINOPHILS RELATIVE PERCENT: 0 % (ref 0–5)
GFR AFRICAN AMERICAN: >60 ML/MIN
GFR NON-AFRICAN AMERICAN: >60 ML/MIN
GFR SERPL CREATININE-BSD FRML MDRD: ABNORMAL ML/MIN/{1.73_M2}
GLUCOSE BLD-MCNC: 164 MG/DL (ref 70–99)
HCT VFR BLD CALC: 42.2 % (ref 36–46)
HEMOGLOBIN: 14.2 G/DL (ref 12–16)
LYMPHOCYTES # BLD: 20 % (ref 15–40)
MCH RBC QN AUTO: 29.4 PG (ref 26–34)
MCHC RBC AUTO-ENTMCNC: 33.6 G/DL (ref 31–37)
MCV RBC AUTO: 87.5 FL (ref 80–100)
MONOCYTES # BLD: 2 % (ref 4–8)
PDW BLD-RTO: 13.1 % (ref 12.1–15.2)
PLATELET # BLD: 352 K/UL (ref 140–450)
POTASSIUM SERPL-SCNC: 4.2 MMOL/L (ref 3.7–5.3)
RBC # BLD: 4.82 M/UL (ref 4–5.2)
SEG NEUTROPHILS: 78 % (ref 47–75)
SEGMENTED NEUTROPHILS ABSOLUTE COUNT: 4.2 K/UL (ref 2.5–7)
SODIUM BLD-SCNC: 141 MMOL/L (ref 135–144)
TOTAL PROTEIN: 7.8 G/DL (ref 6.4–8.3)
TROPONIN, HIGH SENSITIVITY: <6 NG/L (ref 0–14)
TROPONIN, HIGH SENSITIVITY: <6 NG/L (ref 0–14)
WBC # BLD: 5.4 K/UL (ref 3.5–11)

## 2022-07-27 PROCEDURE — 71045 X-RAY EXAM CHEST 1 VIEW: CPT

## 2022-07-27 PROCEDURE — 99285 EMERGENCY DEPT VISIT HI MDM: CPT

## 2022-07-27 PROCEDURE — 80053 COMPREHEN METABOLIC PANEL: CPT

## 2022-07-27 PROCEDURE — 36415 COLL VENOUS BLD VENIPUNCTURE: CPT

## 2022-07-27 PROCEDURE — 93005 ELECTROCARDIOGRAM TRACING: CPT | Performed by: EMERGENCY MEDICINE

## 2022-07-27 PROCEDURE — 84484 ASSAY OF TROPONIN QUANT: CPT

## 2022-07-27 PROCEDURE — 85025 COMPLETE CBC W/AUTO DIFF WBC: CPT

## 2022-07-27 PROCEDURE — 85379 FIBRIN DEGRADATION QUANT: CPT

## 2022-07-27 PROCEDURE — 6370000000 HC RX 637 (ALT 250 FOR IP): Performed by: EMERGENCY MEDICINE

## 2022-07-27 RX ORDER — ASPIRIN 325 MG
325 TABLET, DELAYED RELEASE (ENTERIC COATED) ORAL DAILY
Qty: 30 TABLET | Refills: 0 | Status: SHIPPED | OUTPATIENT
Start: 2022-07-27

## 2022-07-27 RX ADMIN — ASPIRIN 325 MG: 325 TABLET, COATED ORAL at 22:22

## 2022-07-27 ASSESSMENT — PAIN - FUNCTIONAL ASSESSMENT: PAIN_FUNCTIONAL_ASSESSMENT: NONE - DENIES PAIN

## 2022-07-27 NOTE — ED PROVIDER NOTES
[facility] COMPLAINT    Chief Complaint   Patient presents with    Back Pain     Left upper back pain that is sharp in nature x couple hours that is intermittent. No trauma and no falls. HPI    Elva Ray is a 46 y.o. female who presentsto ED from work. By car. With complaint of L posterior chest pain. Onset tonight. Intensity of symptoms moderate to severe. Location of symptoms L posterior thorax. Patient is COVID-19 positive. Patient went to work today. 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 Patient developed left posterior chest pain patient became diaphoretic. PAST MEDICAL HISTORY    Past Medical History:   Diagnosis Date    Anxiety     GERD (gastroesophageal reflux disease)     Headache(784.0)     Hypertension     Sleep apnea        SURGICAL HISTORY    Past Surgical History:   Procedure Laterality Date    CHOLECYSTECTOMY  06-    laproscopic    HYSTERECTOMY (CERVIX STATUS UNKNOWN)         CURRENT MEDICATIONS    Current Outpatient Rx   Medication Sig Dispense Refill    aspirin 325 MG EC tablet Take 1 tablet by mouth in the morning. 30 tablet 0    nirmatrelvir/ritonavir (PAXLOVID) 20 x 150 MG & 10 x 100MG Take 3 tablets (two 150 mg nirmatrelvir and one 100 mg ritonavir tablets) by mouth every 12 hours for 5 days. 30 tablet 0    dexamethasone (DECADRON) 6 MG tablet Take 1 tablet by mouth daily (with breakfast) for 10 days For covid 19 cough 10 tablet 0    ondansetron (ZOFRAN-ODT) 4 MG disintegrating tablet Take 1 tablet by mouth every 12 hours as needed for Nausea or Vomiting 6 tablet 0    doxazosin (CARDURA) 4 MG tablet Take 1 tablet by mouth in the morning and at bedtime 180 tablet 1    CPAP Machine MISC by Does not apply route Sleep apnea, cpap machine and supplies see titration study for ramp up and supplies needed.  1 each 0    metoprolol succinate (TOPROL XL) 100 MG extended release tablet Take 1 tablet by mouth daily In am for hypertension 90 tablet 2       ALLERGIES    Allergies   Allergen Reactions    Codeine Nausea And Vomiting    Lisinopril Hives     Hives/shortness of breath    Morphine      Vomiting and nausea        FAMILY HISTORY    Family History   Problem Relation Age of Onset    Cancer Father         Lung Cancer    Cancer Paternal Grandmother         Ovarian Cancer       SOCIAL HISTORY    Social History     Socioeconomic History    Marital status:      Spouse name: None    Number of children: None    Years of education: None    Highest education level: None   Tobacco Use    Smoking status: Never    Smokeless tobacco: Never   Vaping Use    Vaping Use: Never used   Substance and Sexual Activity    Alcohol use: No    Drug use: No    Sexual activity: Yes     Partners: Male           Review of Systems:  Constitutional:  Denies fever, chills, weight loss or weakness   Eyes:  Denies photophobia or discharge   HENT:  Denies sore throat or ear pain   Respiratory:  Denies cough or shortness of breath   Cardiovascular:  Denies chest pain, palpitations or swelling   GI:  Denies abdominal pain, nausea, vomiting, or diarrhea   Musculoskeletal: Positive for mid back pain  Skin:  Denies rash   Neurologic:  Denies headache, focal weakness or sensory changes   Endocrine:  Denies polyuria or polydypsia   Lymphatic:  Denies swollen glands   Psychiatric:  Denies depression, suicidal ideation or homicidal ideation   All systems negative except as marked. PHYSICAL EXAM    VITAL SIGNS: BP (!) 198/123   Pulse 100   Temp 98.7 °F (37.1 °C) (Oral)   Resp 16   Wt 230 lb (104.3 kg)   SpO2 94%   BMI 37.12 kg/m²    Constitutional: Appearing female with hypertension on arrival HENT:  Normocephalic, Atraumatic, Bilateral external ears normal, Oropharynx moist, No oral exudates, Nose normal. Neck- Normal range of motion, No tenderness, Supple, No stridor. Eyes:  PERRL, EOMI, Conjunctiva normal, No discharge. Respiratory:  Normal breath sounds, No respiratory distress, No wheezing, No chest tenderness. Cardiovascular:  Normal heart rate, Normal rhythm, No murmurs, No rubs, No gallops. GI:  Bowel sounds normal, Soft, No tenderness, No masses, No pulsatile masses. : External genitalia appear normal, No masses or lesions. No discharge. No CVA tenderness. Musculoskeletal:  Intact distal pulses, No edema, No tenderness, No cyanosis, No clubbing. Good range of motion in all major joints. No tenderness to palpation or major deformities noted. Back- No tenderness. Integument:  Warm, Dry, No erythema, No rash. Lymphatic:  No lymphadenopathy noted. Neurologic:  Alert & oriented x 3, Normal motor function, Normal sensory function, No focal deficits noted. Psychiatric:  Affect normal, Judgment normal, Mood normal.     EKG    Sinus rhythm nonspecific ST changes    RADIOLOGY    XR CHEST PORTABLE   Final Result      Mild pulmonary edema                      PROCEDURES        Labs  Labs Reviewed   CBC WITH AUTO DIFFERENTIAL - Abnormal; Notable for the following components:       Result Value    Seg Neutrophils 78 (*)     Monocytes 2 (*)     All other components within normal limits   COMPREHENSIVE METABOLIC PANEL - Abnormal; Notable for the following components:    Glucose 164 (*)     ALT 37 (*)     All other components within normal limits   D-DIMER, QUANTITATIVE   TROPONIN   TROPONIN             Summation      Patient Course: Patient feels better. Patient has 2 negative troponins. D-dimer is negative. She will be sent home. Rest is recommended. Patient is recommended to stop the Paxlovid. Take aspirin daily. ED Medications administered this visit:    Medications   aspirin EC tablet 325 mg (325 mg Oral Given 7/27/22 2222)       New Prescriptions from this visit:    New Prescriptions    ASPIRIN 325 MG EC TABLET    Take 1 tablet by mouth in the morning.        Follow-up:  [facility] ED  [address] [facility] [address] [place] [address]  [phone]    As needed, If symptoms worsen      Final Impression:   1. COVID-19 virus infection    2.  Mid back pain on left side               (Please note that portions of this note were completed with a voice recognition program.  Efforts were made to edit the dictations but occasionally words are mis-transcribed.)         Beny Diaz MD  07/27/22 [id number]

## 2022-07-27 NOTE — LETTER
[facility] ED  [address] [place], [address]  Phone: [phone]               July 27, 2022    Patient: Jerman Jeffers   YOB: 1970   Date of Visit: 7/27/2022       To Whom It May Concern:    Jerman Jeffers was seen and treated in our emergency department on 7/27/2022. She may return to work on 07/30/2022.       Sincerely,       Vini Giordano RN         Signature:__________________________________

## 2022-07-28 LAB
EKG ATRIAL RATE: 84 BPM
EKG P AXIS: 55 DEGREES
EKG P-R INTERVAL: 140 MS
EKG Q-T INTERVAL: 380 MS
EKG QRS DURATION: 94 MS
EKG QTC CALCULATION (BAZETT): 449 MS
EKG R AXIS: 11 DEGREES
EKG T AXIS: 49 DEGREES
EKG VENTRICULAR RATE: 84 BPM

## 2022-07-28 PROCEDURE — 93010 ELECTROCARDIOGRAM REPORT: CPT | Performed by: INTERNAL MEDICINE

## 2022-08-08 ENCOUNTER — NURSE ONLY (OUTPATIENT)
Dept: FAMILY MEDICINE CLINIC | Age: 52
End: 2022-08-08
Payer: COMMERCIAL

## 2022-08-08 DIAGNOSIS — R42 LIGHTHEADED: Primary | ICD-10-CM

## 2022-08-08 LAB — HBA1C MFR BLD: 5.8 %

## 2022-08-08 PROCEDURE — 83036 HEMOGLOBIN GLYCOSYLATED A1C: CPT | Performed by: NURSE PRACTITIONER

## 2022-08-25 RX ORDER — SUCRALFATE 1 G/1
TABLET ORAL
Qty: 120 TABLET | Refills: 0 | OUTPATIENT
Start: 2022-08-25

## 2022-08-27 DIAGNOSIS — I10 ESSENTIAL HYPERTENSION: ICD-10-CM

## 2022-08-29 RX ORDER — METOPROLOL SUCCINATE 100 MG/1
TABLET, EXTENDED RELEASE ORAL
Qty: 90 TABLET | Refills: 2 | Status: SHIPPED | OUTPATIENT
Start: 2022-08-29

## 2022-08-29 RX ORDER — DOXAZOSIN MESYLATE 4 MG/1
TABLET ORAL
Qty: 180 TABLET | Refills: 1 | Status: SHIPPED | OUTPATIENT
Start: 2022-08-29

## 2022-08-29 NOTE — TELEPHONE ENCOUNTER
Last OV: 02/22/22 bilateral knee pain   Last RX:    Next scheduled apt: Visit date not found            Surescript requesting a refill

## 2022-08-31 DIAGNOSIS — K21.9 GASTROESOPHAGEAL REFLUX DISEASE WITHOUT ESOPHAGITIS: Primary | ICD-10-CM

## 2022-08-31 RX ORDER — SUCRALFATE 1 G/1
1 TABLET ORAL
Qty: 90 TABLET | Refills: 0 | Status: SHIPPED | OUTPATIENT
Start: 2022-08-31 | End: 2022-10-05

## 2022-09-19 ENCOUNTER — HOSPITAL ENCOUNTER (OUTPATIENT)
Dept: GENERAL RADIOLOGY | Age: 52
Discharge: HOME | End: 2022-09-21
Payer: COMMERCIAL

## 2022-09-19 ENCOUNTER — TELEPHONE (OUTPATIENT)
Dept: FAMILY MEDICINE CLINIC | Age: 52
End: 2022-09-19

## 2022-09-19 ENCOUNTER — HOSPITAL ENCOUNTER (OUTPATIENT)
Age: 52
Discharge: HOME | End: 2022-09-21

## 2022-09-19 ENCOUNTER — HOSPITAL ENCOUNTER (OUTPATIENT)
Age: 52
Discharge: HOME | End: 2022-09-19
Payer: COMMERCIAL

## 2022-09-19 DIAGNOSIS — R11.0 NAUSEA: ICD-10-CM

## 2022-09-19 DIAGNOSIS — R31.9 HEMATURIA, UNSPECIFIED TYPE: ICD-10-CM

## 2022-09-19 DIAGNOSIS — R31.9 HEMATURIA, UNSPECIFIED TYPE: Primary | ICD-10-CM

## 2022-09-19 DIAGNOSIS — N30.01 ACUTE CYSTITIS WITH HEMATURIA: ICD-10-CM

## 2022-09-19 LAB
-: ABNORMAL
BACTERIA: ABNORMAL
BILIRUBIN URINE: NEGATIVE
COLOR: YELLOW
COMMENT UA: ABNORMAL
EPITHELIAL CELLS UA: ABNORMAL /HPF
GLUCOSE URINE: NEGATIVE
KETONES, URINE: NEGATIVE
LEUKOCYTE ESTERASE, URINE: ABNORMAL
NITRITE, URINE: POSITIVE
PH UA: 5 (ref 5–8)
PROTEIN UA: ABNORMAL
RBC UA: ABNORMAL /HPF (ref 0–2)
SPECIFIC GRAVITY UA: 1.02 (ref 1–1.03)
TURBIDITY: ABNORMAL
URINE HGB: ABNORMAL
UROBILINOGEN, URINE: NORMAL
WBC UA: ABNORMAL /HPF

## 2022-09-19 PROCEDURE — 87086 URINE CULTURE/COLONY COUNT: CPT

## 2022-09-19 PROCEDURE — 36415 COLL VENOUS BLD VENIPUNCTURE: CPT

## 2022-09-19 PROCEDURE — 81001 URINALYSIS AUTO W/SCOPE: CPT

## 2022-09-19 PROCEDURE — 87088 URINE BACTERIA CULTURE: CPT

## 2022-09-19 PROCEDURE — 74018 RADEX ABDOMEN 1 VIEW: CPT

## 2022-09-19 PROCEDURE — 87186 SC STD MICRODIL/AGAR DIL: CPT

## 2022-09-19 RX ORDER — CIPROFLOXACIN 500 MG/1
500 TABLET, FILM COATED ORAL 2 TIMES DAILY
Qty: 14 TABLET | Refills: 0 | Status: SHIPPED | OUTPATIENT
Start: 2022-09-19 | End: 2022-09-26

## 2022-09-19 RX ORDER — PHENAZOPYRIDINE HYDROCHLORIDE 100 MG/1
100 TABLET, FILM COATED ORAL 3 TIMES DAILY PRN
Qty: 9 TABLET | Refills: 0 | Status: SHIPPED | OUTPATIENT
Start: 2022-09-19 | End: 2022-09-22

## 2022-09-19 NOTE — TELEPHONE ENCOUNTER
Pt spoke with me on phone after donating urine. Has urinary frequency started on Saturday with back pain, no fever, hematuria. Urine today with high amount WBC 50+, nitrate +, blood. Suspect UTI with risk for kidney stone. Did get some movement of pain from right flank coming forward then stopped, but now back pain worse again when speaking tonight. Pt agreeable to KUB tonight to rule out kidney stone/hydronephrosis, no hx Uti EVER. Culture pending. Cipro  Pyridium sent to pharmacy.      Thanks

## 2022-09-21 LAB
CULTURE: ABNORMAL
SPECIMEN DESCRIPTION: ABNORMAL

## 2022-09-29 RX ORDER — PANTOPRAZOLE SODIUM 40 MG/1
40 TABLET, DELAYED RELEASE ORAL
Qty: 90 TABLET | Refills: 0 | Status: SHIPPED | OUTPATIENT
Start: 2022-09-29

## 2022-09-30 NOTE — PROGRESS NOTES
Pt with c/o stomach irritation post covid using carafate but still with epigastric discomfort on and off early satiety. Pt on carafate  Pt with previous gallbladder surgery.      [place] Yoselyn APODACA CNP

## 2022-10-05 DIAGNOSIS — K21.9 GASTROESOPHAGEAL REFLUX DISEASE WITHOUT ESOPHAGITIS: ICD-10-CM

## 2022-10-05 RX ORDER — SUCRALFATE 1 G/1
TABLET ORAL
Qty: 90 TABLET | Refills: 2 | Status: SHIPPED | OUTPATIENT
Start: 2022-10-05

## 2022-11-01 DIAGNOSIS — Z13.29 SCREENING FOR THYROID DISORDER: ICD-10-CM

## 2022-11-01 DIAGNOSIS — M79.10 MYALGIA: Primary | ICD-10-CM

## 2022-11-01 DIAGNOSIS — Z86.16 HISTORY OF COVID-19: ICD-10-CM

## 2022-11-01 DIAGNOSIS — M25.571 PAIN IN JOINTS OF BOTH FEET: ICD-10-CM

## 2022-11-01 DIAGNOSIS — M25.572 PAIN IN JOINTS OF BOTH FEET: ICD-10-CM

## 2022-11-02 ENCOUNTER — HOSPITAL ENCOUNTER (OUTPATIENT)
Age: 52
Discharge: HOME | End: 2022-11-02
Payer: COMMERCIAL

## 2022-11-02 DIAGNOSIS — M25.572 PAIN IN JOINTS OF BOTH FEET: ICD-10-CM

## 2022-11-02 DIAGNOSIS — M25.571 PAIN IN JOINTS OF BOTH FEET: ICD-10-CM

## 2022-11-02 DIAGNOSIS — M79.10 MYALGIA: ICD-10-CM

## 2022-11-02 DIAGNOSIS — Z13.29 SCREENING FOR THYROID DISORDER: ICD-10-CM

## 2022-11-02 DIAGNOSIS — Z86.16 HISTORY OF COVID-19: ICD-10-CM

## 2022-11-02 LAB
-: ABNORMAL
ANION GAP SERPL CALCULATED.3IONS-SCNC: 11 MMOL/L (ref 9–17)
BACTERIA: ABNORMAL
BILIRUBIN URINE: NEGATIVE
BUN BLDV-MCNC: 12 MG/DL (ref 6–20)
BUN/CREAT BLD: 18 (ref 9–20)
C-REACTIVE PROTEIN: <3 MG/L (ref 0–5)
CALCIUM SERPL-MCNC: 9.3 MG/DL (ref 8.6–10.4)
CHLORIDE BLD-SCNC: 104 MMOL/L (ref 98–107)
CO2: 23 MMOL/L (ref 20–31)
COLOR: YELLOW
COMMENT UA: ABNORMAL
CREAT SERPL-MCNC: 0.67 MG/DL (ref 0.5–0.9)
EPITHELIAL CELLS UA: ABNORMAL /HPF
GFR SERPL CREATININE-BSD FRML MDRD: >60 ML/MIN/1.73M2
GLUCOSE BLD-MCNC: 101 MG/DL (ref 70–99)
GLUCOSE URINE: NEGATIVE
KETONES, URINE: NEGATIVE
LEUKOCYTE ESTERASE, URINE: NEGATIVE
NITRITE, URINE: NEGATIVE
PH UA: 5 (ref 5–8)
POTASSIUM SERPL-SCNC: 4.4 MMOL/L (ref 3.7–5.3)
PROTEIN UA: ABNORMAL
RBC UA: ABNORMAL /HPF (ref 0–2)
SEDIMENTATION RATE, ERYTHROCYTE: 7 MM/HR (ref 0–30)
SODIUM BLD-SCNC: 138 MMOL/L (ref 135–144)
SPECIFIC GRAVITY UA: 1.02 (ref 1–1.03)
TSH SERPL DL<=0.05 MIU/L-ACNC: 2.7 UIU/ML (ref 0.3–5)
TURBIDITY: CLEAR
URIC ACID: 7.4 MG/DL (ref 2.4–5.7)
URINE HGB: ABNORMAL
UROBILINOGEN, URINE: NORMAL
WBC UA: ABNORMAL /HPF

## 2022-11-02 PROCEDURE — 80048 BASIC METABOLIC PNL TOTAL CA: CPT

## 2022-11-02 PROCEDURE — 81001 URINALYSIS AUTO W/SCOPE: CPT

## 2022-11-02 PROCEDURE — 84443 ASSAY THYROID STIM HORMONE: CPT

## 2022-11-02 PROCEDURE — 36415 COLL VENOUS BLD VENIPUNCTURE: CPT

## 2022-11-02 PROCEDURE — 86140 C-REACTIVE PROTEIN: CPT

## 2022-11-02 PROCEDURE — 84550 ASSAY OF BLOOD/URIC ACID: CPT

## 2022-11-02 PROCEDURE — 85652 RBC SED RATE AUTOMATED: CPT

## 2022-11-02 RX ORDER — PREDNISONE 20 MG/1
TABLET ORAL
Qty: 10 TABLET | Refills: 0 | Status: SHIPPED | OUTPATIENT
Start: 2022-11-02

## 2022-11-02 RX ORDER — ALLOPURINOL 100 MG/1
200 TABLET ORAL DAILY
Qty: 60 TABLET | Refills: 2 | Status: SHIPPED | OUTPATIENT
Start: 2022-11-02

## 2022-11-04 PROBLEM — M1A.09X0 IDIOPATHIC CHRONIC GOUT OF MULTIPLE SITES WITHOUT TOPHUS: Status: ACTIVE | Noted: 2022-11-04

## 2022-12-09 ENCOUNTER — PATIENT MESSAGE (OUTPATIENT)
Dept: PRIMARY CARE CLINIC | Age: 52
End: 2022-12-09

## 2022-12-09 NOTE — TELEPHONE ENCOUNTER
From: Sharon Valladares  To: Loretta Baxter  Sent: 12/9/2022 10:28 AM EST  Subject: Appt    Hi Moorlanddorina Wallace. Something has come up this morning and I am unable to make my appt. If we can I will reschedule when I come back in march.

## 2023-01-03 RX ORDER — PANTOPRAZOLE SODIUM 40 MG/1
TABLET, DELAYED RELEASE ORAL
Qty: 90 TABLET | Refills: 0 | Status: SHIPPED | OUTPATIENT
Start: 2023-01-03

## 2023-01-30 RX ORDER — ALLOPURINOL 100 MG/1
200 TABLET ORAL DAILY
Qty: 60 TABLET | Refills: 2 | Status: SHIPPED | OUTPATIENT
Start: 2023-01-30

## 2023-01-30 NOTE — TELEPHONE ENCOUNTER
Last OV: 02/22/22 10/04/21 chronic   Last RX:    Next scheduled apt: Visit date not found            Surescript requesting a refill

## 2023-03-02 RX ORDER — DOXAZOSIN MESYLATE 4 MG/1
TABLET ORAL
Qty: 180 TABLET | Refills: 1 | Status: SHIPPED | OUTPATIENT
Start: 2023-03-02

## 2023-03-02 NOTE — TELEPHONE ENCOUNTER
Last OV: 02/22/22 chronic   Last RX:    Next scheduled apt: Visit date not found            Surescript requesting a refill

## 2023-03-27 ENCOUNTER — OFFICE VISIT (OUTPATIENT)
Dept: FAMILY MEDICINE CLINIC | Age: 53
End: 2023-03-27
Payer: COMMERCIAL

## 2023-03-27 VITALS
HEART RATE: 85 BPM | OXYGEN SATURATION: 94 % | BODY MASS INDEX: 38.09 KG/M2 | HEIGHT: 66 IN | DIASTOLIC BLOOD PRESSURE: 88 MMHG | WEIGHT: 237 LBS | SYSTOLIC BLOOD PRESSURE: 136 MMHG

## 2023-03-27 DIAGNOSIS — Z12.11 COLON CANCER SCREENING: ICD-10-CM

## 2023-03-27 DIAGNOSIS — I10 ESSENTIAL HYPERTENSION: ICD-10-CM

## 2023-03-27 DIAGNOSIS — K21.9 GASTROESOPHAGEAL REFLUX DISEASE WITHOUT ESOPHAGITIS: ICD-10-CM

## 2023-03-27 DIAGNOSIS — Z12.31 BREAST CANCER SCREENING BY MAMMOGRAM: ICD-10-CM

## 2023-03-27 DIAGNOSIS — Z13.220 SCREENING CHOLESTEROL LEVEL: ICD-10-CM

## 2023-03-27 DIAGNOSIS — R23.2 HOT FLASHES: ICD-10-CM

## 2023-03-27 DIAGNOSIS — Z00.00 ENCOUNTER FOR WELL ADULT EXAM WITHOUT ABNORMAL FINDINGS: Primary | ICD-10-CM

## 2023-03-27 PROCEDURE — G8484 FLU IMMUNIZE NO ADMIN: HCPCS | Performed by: NURSE PRACTITIONER

## 2023-03-27 PROCEDURE — 99396 PREV VISIT EST AGE 40-64: CPT | Performed by: NURSE PRACTITIONER

## 2023-03-27 PROCEDURE — 3078F DIAST BP <80 MM HG: CPT | Performed by: NURSE PRACTITIONER

## 2023-03-27 PROCEDURE — 3074F SYST BP LT 130 MM HG: CPT | Performed by: NURSE PRACTITIONER

## 2023-03-27 RX ORDER — HYDRALAZINE HYDROCHLORIDE 10 MG/1
10 TABLET, FILM COATED ORAL 2 TIMES DAILY
Qty: 60 TABLET | Refills: 2 | Status: SHIPPED | OUTPATIENT
Start: 2023-03-27 | End: 2024-03-26

## 2023-03-27 RX ORDER — METOPROLOL SUCCINATE 100 MG/1
100 TABLET, EXTENDED RELEASE ORAL EVERY MORNING
Qty: 90 TABLET | Refills: 2 | Status: SHIPPED | OUTPATIENT
Start: 2023-03-27

## 2023-03-27 RX ORDER — ALLOPURINOL 100 MG/1
200 TABLET ORAL DAILY
Qty: 180 TABLET | Refills: 1 | Status: SHIPPED | OUTPATIENT
Start: 2023-03-27

## 2023-03-27 RX ORDER — PANTOPRAZOLE SODIUM 40 MG/1
TABLET, DELAYED RELEASE ORAL
Qty: 90 TABLET | Refills: 0 | Status: SHIPPED | OUTPATIENT
Start: 2023-03-27

## 2023-03-27 RX ORDER — ESCITALOPRAM OXALATE 5 MG/1
5 TABLET ORAL DAILY
Qty: 30 TABLET | Refills: 1 | Status: SHIPPED | OUTPATIENT
Start: 2023-03-27

## 2023-03-27 RX ORDER — SUCRALFATE 1 G/1
1 TABLET ORAL 3 TIMES DAILY
Qty: 90 TABLET | Refills: 2 | Status: SHIPPED | OUTPATIENT
Start: 2023-03-27

## 2023-03-27 SDOH — ECONOMIC STABILITY: INCOME INSECURITY: HOW HARD IS IT FOR YOU TO PAY FOR THE VERY BASICS LIKE FOOD, HOUSING, MEDICAL CARE, AND HEATING?: NOT HARD AT ALL

## 2023-03-27 SDOH — ECONOMIC STABILITY: FOOD INSECURITY: WITHIN THE PAST 12 MONTHS, THE FOOD YOU BOUGHT JUST DIDN'T LAST AND YOU DIDN'T HAVE MONEY TO GET MORE.: NEVER TRUE

## 2023-03-27 SDOH — ECONOMIC STABILITY: FOOD INSECURITY: WITHIN THE PAST 12 MONTHS, YOU WORRIED THAT YOUR FOOD WOULD RUN OUT BEFORE YOU GOT MONEY TO BUY MORE.: NEVER TRUE

## 2023-03-27 SDOH — ECONOMIC STABILITY: HOUSING INSECURITY
IN THE LAST 12 MONTHS, WAS THERE A TIME WHEN YOU DID NOT HAVE A STEADY PLACE TO SLEEP OR SLEPT IN A SHELTER (INCLUDING NOW)?: NO

## 2023-03-27 ASSESSMENT — PATIENT HEALTH QUESTIONNAIRE - PHQ9
2. FEELING DOWN, DEPRESSED OR HOPELESS: 0
SUM OF ALL RESPONSES TO PHQ QUESTIONS 1-9: 0
SUM OF ALL RESPONSES TO PHQ9 QUESTIONS 1 & 2: 0
1. LITTLE INTEREST OR PLEASURE IN DOING THINGS: 0
SUM OF ALL RESPONSES TO PHQ QUESTIONS 1-9: 0

## 2023-03-27 ASSESSMENT — ENCOUNTER SYMPTOMS
COUGH: 0
RHINORRHEA: 0
DIARRHEA: 0
CONSTIPATION: 0
SINUS PRESSURE: 0
EYES NEGATIVE: 1
SHORTNESS OF BREATH: 0
BLOOD IN STOOL: 0

## 2023-03-27 NOTE — PATIENT INSTRUCTIONS
you care for yourself at home? Set realistic goals. Many people expect to lose much more weight than is likely. A weight loss of 5% to 10% of your body weight may be enough to improve your health. Get family and friends involved to provide support. Talk to them about why you are trying to lose weight, and ask them to help. They can help by participating in exercise and having meals with you, even if they may be eating something different. Find what works best for you. If you do not have time or do not like to cook, a program that offers meal replacement bars or shakes may be better for you. Or if you like to prepare meals, finding a plan that includes daily menus and recipes may be best.  Ask your doctor about other health professionals who can help you achieve your weight loss goals. A dietitian can help you make healthy changes in your diet. An exercise specialist or  can help you develop a safe and effective exercise program.  A counselor or psychiatrist can help you cope with issues such as depression, anxiety, or family problems that can make it hard to focus on weight loss. Consider joining a support group for people who are trying to lose weight. Your doctor can suggest groups in your area. Where can you learn more? Go to http://www.woods.com/ and enter U357 to learn more about \"Starting a Weight Loss Plan: Care Instructions. \"  Current as of: May 9, 2022               Content Version: 13.6  © 6409-0497 [employer]wise, Incorporated. Care instructions adapted under license by [facility] ([facility]). If you have questions about a medical condition or this instruction, always ask your healthcare professional. [address] any warranty or liability for your use of this information.

## 2023-03-28 NOTE — PROGRESS NOTES
Referral internal
R-2Normal  -     Comprehensive Metabolic Panel; Future  3. Gastroesophageal reflux disease without esophagitis  -     pantoprazole (PROTONIX) 40 MG tablet; take 1 tablet by mouth every morning before breakfast ON AN EMPTY STOMACH for GASTRITIS, Disp-90 tablet, R-0Normal  -     sucralfate (CARAFATE) 1 GM tablet; Take 1 tablet by mouth in the morning, at noon, and at bedtime, Disp-90 tablet, R-2Normal  -     [facility] - Emi Goldstein MD, Gastroenterology, [facility]  4. Screening cholesterol level  -     Lipid Panel; Future  5. Hot flashes  -     TSH with Reflex; Future  -     escitalopram (LEXAPRO) 5 MG tablet; Take 1 tablet by mouth daily For hot flashes, Disp-30 tablet, R-1Normal  6. BMI 38.0-38.9,adult  -     TSH with Reflex; Future  7. Colon cancer screening  -     Martha Carrero MD, Gastroenterology, [facility]  8. Breast cancer screening by mammogram  -     [facility] DIGITAL SCREEN BILATERAL; Future       Personalized Preventive Plan   Current Health Maintenance Status    There is no immunization history on file for this patient. Health Maintenance   Topic Date Due    COVID-19 Vaccine (1) Never done    Hepatitis C screen  Never done    DTaP/Tdap/Td vaccine (1 - Tdap) Never done    Colorectal Cancer Screen  Never done    Shingles vaccine (1 of 2) Never done    Flu vaccine (1) Never done    A1C test (Diabetic or Prediabetic)  08/08/2023    Depression Screen  03/27/2024    Breast cancer screen  04/25/2024    Lipids  11/19/2025    HIV screen  Addressed    Hepatitis A vaccine  Aged Out    Hib vaccine  Aged Out    Meningococcal (ACWY) vaccine  Aged Out    Pneumococcal 0-64 years Vaccine  Aged Out     Recommendations for [employer] Due: see orders and patient instructions/AVS.    Return in 6 months (on 9/27/2023) for HTN check.

## 2023-03-29 ENCOUNTER — HOSPITAL ENCOUNTER (OUTPATIENT)
Age: 53
Discharge: HOME | End: 2023-03-29
Payer: COMMERCIAL

## 2023-03-29 DIAGNOSIS — Z13.220 SCREENING CHOLESTEROL LEVEL: ICD-10-CM

## 2023-03-29 DIAGNOSIS — I10 ESSENTIAL HYPERTENSION: ICD-10-CM

## 2023-03-29 DIAGNOSIS — R23.2 HOT FLASHES: ICD-10-CM

## 2023-03-29 LAB
ALBUMIN SERPL-MCNC: 4.4 G/DL (ref 3.5–5.2)
ALP SERPL-CCNC: 62 U/L (ref 35–104)
ALT SERPL-CCNC: 41 U/L (ref 5–33)
ANION GAP SERPL CALCULATED.3IONS-SCNC: 13 MMOL/L (ref 9–17)
AST SERPL-CCNC: 29 U/L
BILIRUB SERPL-MCNC: 0.5 MG/DL (ref 0.3–1.2)
BUN SERPL-MCNC: 10 MG/DL (ref 6–20)
BUN/CREAT BLD: 14 (ref 9–20)
CALCIUM SERPL-MCNC: 9.3 MG/DL (ref 8.6–10.4)
CHLORIDE SERPL-SCNC: 102 MMOL/L (ref 98–107)
CHOLEST SERPL-MCNC: 179 MG/DL
CHOLESTEROL/HDL RATIO: 4.4
CO2 SERPL-SCNC: 23 MMOL/L (ref 20–31)
CREAT SERPL-MCNC: 0.7 MG/DL (ref 0.5–0.9)
GFR SERPL CREATININE-BSD FRML MDRD: >60 ML/MIN/1.73M2
GLUCOSE SERPL-MCNC: 103 MG/DL (ref 70–99)
HDLC SERPL-MCNC: 41 MG/DL
LDLC SERPL CALC-MCNC: 113 MG/DL (ref 0–130)
PATIENT FASTING?: YES
POTASSIUM SERPL-SCNC: 4.8 MMOL/L (ref 3.7–5.3)
PROT SERPL-MCNC: 7.5 G/DL (ref 6.4–8.3)
SODIUM SERPL-SCNC: 138 MMOL/L (ref 135–144)
TRIGL SERPL-MCNC: 126 MG/DL
TSH SERPL-ACNC: 2.42 UIU/ML (ref 0.3–5)

## 2023-03-29 PROCEDURE — 80061 LIPID PANEL: CPT

## 2023-03-29 PROCEDURE — 80053 COMPREHEN METABOLIC PANEL: CPT

## 2023-03-29 PROCEDURE — 36415 COLL VENOUS BLD VENIPUNCTURE: CPT

## 2023-03-29 PROCEDURE — 84443 ASSAY THYROID STIM HORMONE: CPT

## 2023-03-30 DIAGNOSIS — K21.9 GASTROESOPHAGEAL REFLUX DISEASE WITHOUT ESOPHAGITIS: ICD-10-CM

## 2023-03-30 RX ORDER — PANTOPRAZOLE SODIUM 40 MG/1
TABLET, DELAYED RELEASE ORAL
Qty: 90 TABLET | Refills: 0 | OUTPATIENT
Start: 2023-03-30

## 2023-06-05 ENCOUNTER — HOSPITAL ENCOUNTER (OUTPATIENT)
Dept: MAMMOGRAPHY | Age: 53
Discharge: HOME | End: 2023-06-07
Payer: COMMERCIAL

## 2023-06-05 DIAGNOSIS — Z12.31 BREAST CANCER SCREENING BY MAMMOGRAM: ICD-10-CM

## 2023-06-05 PROCEDURE — 77063 BREAST TOMOSYNTHESIS BI: CPT

## 2023-06-21 DIAGNOSIS — K21.9 GASTROESOPHAGEAL REFLUX DISEASE WITHOUT ESOPHAGITIS: ICD-10-CM

## 2023-06-21 RX ORDER — PANTOPRAZOLE SODIUM 20 MG/1
20 TABLET, DELAYED RELEASE ORAL DAILY
Qty: 90 TABLET | Refills: 0 | Status: SHIPPED | OUTPATIENT
Start: 2023-06-21

## 2023-06-21 NOTE — TELEPHONE ENCOUNTER
Last OV: 3/27/2023  physical, chronic   Last RX:    Next scheduled apt: 9/25/2023            Surescript requesting a refill

## 2023-08-28 RX ORDER — DOXAZOSIN MESYLATE 4 MG/1
TABLET ORAL
Qty: 180 TABLET | Refills: 1 | Status: SHIPPED | OUTPATIENT
Start: 2023-08-28

## 2023-08-28 NOTE — TELEPHONE ENCOUNTER
Last OV: 3/27/2023  Last RX: 3/2/2023   Next scheduled apt: 9/25/2023        Surescripts requesting refill

## 2023-09-22 DIAGNOSIS — K21.9 GASTROESOPHAGEAL REFLUX DISEASE WITHOUT ESOPHAGITIS: ICD-10-CM

## 2023-09-22 NOTE — TELEPHONE ENCOUNTER
Last OV: 3/27/2023  Last RX: 3/27/203, 6/21/2023   Next scheduled apt: 9/25/2023      Surescripts requesting refill

## 2023-09-26 RX ORDER — ALLOPURINOL 100 MG/1
200 TABLET ORAL DAILY
Qty: 180 TABLET | Refills: 1 | Status: SHIPPED | OUTPATIENT
Start: 2023-09-26

## 2023-09-26 RX ORDER — PANTOPRAZOLE SODIUM 20 MG/1
20 TABLET, DELAYED RELEASE ORAL DAILY
Qty: 90 TABLET | Refills: 0 | Status: SHIPPED | OUTPATIENT
Start: 2023-09-26

## 2023-09-26 NOTE — TELEPHONE ENCOUNTER
Pt missed appt , please send no show letter, (let me know # please ) .  Refill done x 1     Josefina Joya

## 2023-12-23 DIAGNOSIS — K21.9 GASTROESOPHAGEAL REFLUX DISEASE WITHOUT ESOPHAGITIS: ICD-10-CM

## 2023-12-26 RX ORDER — PANTOPRAZOLE SODIUM 20 MG/1
TABLET, DELAYED RELEASE ORAL
Qty: 90 TABLET | Refills: 0 | Status: SHIPPED | OUTPATIENT
Start: 2023-12-26

## 2023-12-26 NOTE — TELEPHONE ENCOUNTER
Last OV: 3/27/2023  Last RX: 09-26-23   Next scheduled apt: Visit date not found    Pt has not been seen in office since 03-27-23. Left vm for pt to call office back.

## 2024-02-18 DIAGNOSIS — I10 ESSENTIAL HYPERTENSION: ICD-10-CM

## 2024-02-20 NOTE — TELEPHONE ENCOUNTER
Last OV: 3/27/2023  Last RX: 3/27/2023, 8/2/2023   Next scheduled apt: Visit date not found      Surescripts requesting refill

## 2024-02-21 RX ORDER — METOPROLOL SUCCINATE 100 MG/1
100 TABLET, EXTENDED RELEASE ORAL EVERY MORNING
Qty: 30 TABLET | Refills: 0 | Status: SHIPPED | OUTPATIENT
Start: 2024-02-21

## 2024-02-21 RX ORDER — DOXAZOSIN MESYLATE 4 MG/1
TABLET ORAL
Qty: 60 TABLET | Refills: 0 | Status: SHIPPED | OUTPATIENT
Start: 2024-02-21